# Patient Record
Sex: MALE | Race: WHITE | Employment: STUDENT | URBAN - METROPOLITAN AREA
[De-identification: names, ages, dates, MRNs, and addresses within clinical notes are randomized per-mention and may not be internally consistent; named-entity substitution may affect disease eponyms.]

---

## 2017-05-27 ENCOUNTER — APPOINTMENT (OUTPATIENT)
Dept: CT IMAGING | Age: 27
End: 2017-05-27
Attending: EMERGENCY MEDICINE
Payer: COMMERCIAL

## 2017-05-27 ENCOUNTER — HOSPITAL ENCOUNTER (EMERGENCY)
Age: 27
Discharge: HOME | End: 2017-05-27
Attending: EMERGENCY MEDICINE
Payer: COMMERCIAL

## 2017-05-27 VITALS
RESPIRATION RATE: 18 BRPM | HEART RATE: 80 BPM | WEIGHT: 185 LBS | DIASTOLIC BLOOD PRESSURE: 76 MMHG | TEMPERATURE: 99 F | SYSTOLIC BLOOD PRESSURE: 132 MMHG | OXYGEN SATURATION: 100 % | BODY MASS INDEX: 24.52 KG/M2 | HEIGHT: 73 IN

## 2017-05-27 DIAGNOSIS — K51.90 ULCERATIVE COLITIS WITHOUT COMPLICATIONS, UNSPECIFIED LOCATION (HCC): Primary | ICD-10-CM

## 2017-05-27 PROCEDURE — 99285 EMERGENCY DEPT VISIT HI MDM: CPT

## 2017-05-27 PROCEDURE — 80053 COMPREHEN METABOLIC PANEL: CPT | Performed by: EMERGENCY MEDICINE

## 2017-05-27 PROCEDURE — 74177 CT ABD & PELVIS W/CONTRAST: CPT | Performed by: EMERGENCY MEDICINE

## 2017-05-27 PROCEDURE — 85025 COMPLETE CBC W/AUTO DIFF WBC: CPT | Performed by: EMERGENCY MEDICINE

## 2017-05-27 PROCEDURE — 96361 HYDRATE IV INFUSION ADD-ON: CPT

## 2017-05-27 PROCEDURE — 96375 TX/PRO/DX INJ NEW DRUG ADDON: CPT

## 2017-05-27 PROCEDURE — 96374 THER/PROPH/DIAG INJ IV PUSH: CPT

## 2017-05-27 PROCEDURE — 81003 URINALYSIS AUTO W/O SCOPE: CPT | Performed by: EMERGENCY MEDICINE

## 2017-05-27 RX ORDER — PREDNISONE 20 MG/1
40 TABLET ORAL DAILY
Qty: 28 TABLET | Refills: 0 | Status: SHIPPED | OUTPATIENT
Start: 2017-05-27 | End: 2017-06-10

## 2017-05-27 RX ORDER — MORPHINE SULFATE 4 MG/ML
4 INJECTION, SOLUTION INTRAMUSCULAR; INTRAVENOUS EVERY 30 MIN PRN
Status: DISCONTINUED | OUTPATIENT
Start: 2017-05-27 | End: 2017-05-27

## 2017-05-27 RX ORDER — ONDANSETRON 2 MG/ML
4 INJECTION INTRAMUSCULAR; INTRAVENOUS ONCE
Status: COMPLETED | OUTPATIENT
Start: 2017-05-27 | End: 2017-05-27

## 2017-05-27 RX ORDER — HYDROCODONE BITARTRATE AND ACETAMINOPHEN 5; 325 MG/1; MG/1
1-2 TABLET ORAL EVERY 4 HOURS PRN
Qty: 20 TABLET | Refills: 0 | Status: ON HOLD | OUTPATIENT
Start: 2017-05-27 | End: 2017-06-09

## 2017-05-27 NOTE — ED PROVIDER NOTES
Patient Seen in: [facility] Emergency Department In [place]    History   Patient presents with:  Abdomen/Flank Pain (GI/)  Fever (infectious)    Stated Complaint: has UC, c/o abd pain, fever    HPI    [place] is a 82-year-old with a history of ulcerative c alert and oriented ×3 and appears in no distress  HEENT exam: Tympanic membranes are clear. Canals are normal with no auricular preauricular or mastoid tenderness. Oropharyngeal exam shows no uvula edema or shift.   There is no tongue elevation and palati followed up in the next week with GI services who will write for further steroid taper.   Patient is to return to emergency room for worsening symptoms or other complaints      Disposition and Plan     Clinical Impression:  Ulcerative colitis without compli

## 2017-06-02 ENCOUNTER — APPOINTMENT (OUTPATIENT)
Dept: GENERAL RADIOLOGY | Age: 27
DRG: 386 | End: 2017-06-02
Attending: EMERGENCY MEDICINE
Payer: COMMERCIAL

## 2017-06-02 ENCOUNTER — LAB ENCOUNTER (OUTPATIENT)
Dept: LAB | Facility: HOSPITAL | Age: 27
DRG: 386 | End: 2017-06-02
Attending: EMERGENCY MEDICINE
Payer: COMMERCIAL

## 2017-06-02 ENCOUNTER — HOSPITAL ENCOUNTER (INPATIENT)
Facility: HOSPITAL | Age: 27
LOS: 7 days | Discharge: ANOTHER HOSPITAL | DRG: 386 | End: 2017-06-09
Attending: EMERGENCY MEDICINE | Admitting: INTERNAL MEDICINE
Payer: COMMERCIAL

## 2017-06-02 DIAGNOSIS — R19.7 BLOODY DIARRHEA: Primary | ICD-10-CM

## 2017-06-02 DIAGNOSIS — K51.911 ULCERATIVE COLITIS WITH RECTAL BLEEDING, UNSPECIFIED LOCATION (HCC): ICD-10-CM

## 2017-06-02 DIAGNOSIS — K51.00 ULCERATIVE (CHRONIC) ENTEROCOLITIS (HCC): Primary | ICD-10-CM

## 2017-06-02 PROCEDURE — 87340 HEPATITIS B SURFACE AG IA: CPT

## 2017-06-02 PROCEDURE — 36415 COLL VENOUS BLD VENIPUNCTURE: CPT

## 2017-06-02 PROCEDURE — 86480 TB TEST CELL IMMUN MEASURE: CPT

## 2017-06-02 PROCEDURE — 99223 1ST HOSP IP/OBS HIGH 75: CPT | Performed by: HOSPITALIST

## 2017-06-02 PROCEDURE — 74020 XR ABDOMEN, OBSTRUCTIVE SERIES (CPT=74020): CPT | Performed by: EMERGENCY MEDICINE

## 2017-06-02 RX ORDER — HYDROMORPHONE HYDROCHLORIDE 1 MG/ML
0.5 INJECTION, SOLUTION INTRAMUSCULAR; INTRAVENOUS; SUBCUTANEOUS EVERY 30 MIN PRN
Status: DISCONTINUED | OUTPATIENT
Start: 2017-06-02 | End: 2017-06-08

## 2017-06-02 RX ORDER — HYDROMORPHONE HYDROCHLORIDE 1 MG/ML
1 INJECTION, SOLUTION INTRAMUSCULAR; INTRAVENOUS; SUBCUTANEOUS
Status: DISCONTINUED | OUTPATIENT
Start: 2017-06-02 | End: 2017-06-08

## 2017-06-02 RX ORDER — ONDANSETRON 2 MG/ML
4 INJECTION INTRAMUSCULAR; INTRAVENOUS ONCE
Status: COMPLETED | OUTPATIENT
Start: 2017-06-02 | End: 2017-06-02

## 2017-06-02 RX ORDER — HYDROMORPHONE HYDROCHLORIDE 1 MG/ML
0.5 INJECTION, SOLUTION INTRAMUSCULAR; INTRAVENOUS; SUBCUTANEOUS EVERY 30 MIN PRN
Status: DISCONTINUED | OUTPATIENT
Start: 2017-06-02 | End: 2017-06-02

## 2017-06-02 RX ORDER — DICYCLOMINE HYDROCHLORIDE 10 MG/5ML
20 SOLUTION ORAL
COMMUNITY

## 2017-06-02 RX ORDER — SODIUM CHLORIDE 9 MG/ML
INJECTION, SOLUTION INTRAVENOUS CONTINUOUS
Status: ACTIVE | OUTPATIENT
Start: 2017-06-02 | End: 2017-06-03

## 2017-06-02 RX ORDER — ONDANSETRON 2 MG/ML
4 INJECTION INTRAMUSCULAR; INTRAVENOUS EVERY 6 HOURS PRN
Status: DISCONTINUED | OUTPATIENT
Start: 2017-06-02 | End: 2017-06-09

## 2017-06-02 RX ORDER — ONDANSETRON 2 MG/ML
4 INJECTION INTRAMUSCULAR; INTRAVENOUS EVERY 4 HOURS PRN
Status: DISCONTINUED | OUTPATIENT
Start: 2017-06-02 | End: 2017-06-03

## 2017-06-02 RX ORDER — SODIUM CHLORIDE 9 MG/ML
INJECTION, SOLUTION INTRAVENOUS CONTINUOUS
Status: DISCONTINUED | OUTPATIENT
Start: 2017-06-03 | End: 2017-06-09

## 2017-06-03 ENCOUNTER — APPOINTMENT (OUTPATIENT)
Dept: CT IMAGING | Facility: HOSPITAL | Age: 27
DRG: 386 | End: 2017-06-03
Attending: INTERNAL MEDICINE
Payer: COMMERCIAL

## 2017-06-03 PROCEDURE — 99232 SBSQ HOSP IP/OBS MODERATE 35: CPT | Performed by: HOSPITALIST

## 2017-06-03 PROCEDURE — 74177 CT ABD & PELVIS W/CONTRAST: CPT | Performed by: INTERNAL MEDICINE

## 2017-06-03 RX ORDER — LOPERAMIDE HYDROCHLORIDE 2 MG/1
2 CAPSULE ORAL 4 TIMES DAILY PRN
Status: DISCONTINUED | OUTPATIENT
Start: 2017-06-03 | End: 2017-06-09

## 2017-06-03 NOTE — PROGRESS NOTES
[facility] HOSPITAL[facility]  Progress Note     Christen Rey Patient Status:  Observation    1990 MRN [id number]   [facility] 4NW-A Attending Ru Cartagena MD   Hosp Day # 1 PCP None Pcp     Chief Complaint: Abd Pain  S:  Pain in LLQ, c above  4. Hyponatremia  1. IVF  5. Hypokalemia  1.  Replace     Quality:  · DVT Prophylaxis: ambulate, low risk  · CODE status: full  Estimated date of discharge: TBD  Discharge is dependent on: progress  At this point Mr. Elizabeth Garland is expected to be disch

## 2017-06-03 NOTE — PAYOR COMM NOTE
Attending Physician: Ashutosh Coe MD    Review Type: ADMISSION   Reviewer: Thad Odell       Date: Teri 3, 2017 - 2:27 PM  Payor: [employer] [id number]  Authorization Number: N/[id number]  Admit date: 6/2/2017  8:07 PM   Admitted from Emergency Dept.: yes       ED Provider N (LIALDA OR),  Take by mouth. Budesonide (UCERIS OR),  Take by mouth.   predniSONE 20 MG Oral Tab,  Take 2 tablets (40 mg total) by mouth daily.    HYDROcodone-acetaminophen 5-325 MG Oral Tab,  Take 1-2 tablets by mouth every 4 (four) hours as needed for P URINALYSIS WITH CULTURE REFLEX - Abnormal; Notable for the following:     Clarity Urine Slightly Cloudy (*)     All other components within normal limits   CBC W/ DIFFERENTIAL - Abnormal; Notable for the following:     Neutrophil Absolute Prelim 7.96 (*) Mulugeta Lo MD Service:  Hospitalist Author Type:  Physician    Filed:  6/3/2017 12:57 AM Note Time:  6/2/2017 11:25 PM Status:  Signed    :  Mulugeta Lo MD (Physician)             [facility]  History and Physical   Chief Complaints: guarding or organomegaly. Neurologic: No focal neurological deficits. CNII-XII grossly intact. Musculoskeletal: Moves all extremities. Extremities: No edema or cyanosis. Integument: No rashes or lesions. Psychiatric: Appropriate mood and affect.

## 2017-06-03 NOTE — PLAN OF CARE
GASTROINTESTINAL - ADULT    • Maintains or returns to baseline bowel function Not Progressing          GASTROINTESTINAL - ADULT    • Minimal or absence of nausea and vomiting Progressing        METABOLIC/FLUID AND ELECTROLYTES - ADULT    • Electrolytes kinga

## 2017-06-03 NOTE — H&P
[facility] HOSPITALIST  History and Physical     Sid Norman Patient Status:  Observation    1990 MRN [id number]   [facility] 4NW-A Attending Jacklyn Sky MD   Hosp Day # 0 PCP None Pcp     Chief Complaints:  Abdominal pain    Hist positives and negatives noted in the HPI. Physical Exam:    /69 mmHg  Pulse 73  Temp(Src) 98.8 °F (37.1 °C) (Oral)  Resp 18  Ht 6' 2\" (1.88 m)  Wt 181 lb 3.5 oz (82.2 kg)  BMI 23.26 kg/m2  SpO2 99%  General: No acute distress.  Alert and oriented

## 2017-06-03 NOTE — ED PROVIDER NOTES
Patient Seen in: [facility] Emergency Department In [place]    History   Patient presents with:  Abdomen/Flank Pain (GI/)    Stated Complaint: abd pain    HPI    24-year-old male diagnosed with ulcerative colitis about 6 months ago presents the emergenc elements reviewed from today and agreed except as otherwise stated in HPI.     Physical Exam       ED Triage Vitals   BP 06/02/17 2030 124/68 mmHg   Pulse 06/02/17 2030 80   Resp 06/02/17 2030 16   Temp 06/02/17 2030 99.3 °F (37.4 °C)   Temp src 06/02/17 20 Status                     ---------                               -----------         ------                     CBC W/ DIFFERENTIAL[[id number]]           Abnormal            Final result                 Please view results for these tests on the individual

## 2017-06-03 NOTE — CONSULTS
[facility]                       Gastroenterology [address] [facility]    Reatha Leisure Patient Status:  Observation    1990 MRN [id number]   [facility] 4NW-A Attending Darin Wen MD   [facility]) injection 4 mg 4 mg Intravenous Q4H PRN   HYDROmorphone HCl PF (DILAUDID) 1 MG/ML injection 1 mg 1 mg Intravenous Q3H PRN   ondansetron HCl (ZOFRAN) injection 4 mg 4 mg Intravenous Q6H PRN   0.9%  NaCl infusion  Intravenous Continuous     Allergie Wt 181 lb 3.5 oz (82.2 kg)  BMI 23.26 kg/m2  SpO2 100%  Gen: AAO x 3, able to speak in complete sentences  HENT: NCAT, EOMI, PERRL, oropharynx is clear with moist mucosal membranes  Eyes: Sclerae are anicteric  Neck:  Supple without nuchal rigidity;  No lym diagnosed with ulcerative colitis January 2017. For past 6 weeks chronic abdomen pain.  Kika Tia for past few days with fever.         FINDINGS:    BOWEL GAS PATTERN:  Multiple air-filled loops of small and large bowel are identified, small bowel loops brittni

## 2017-06-04 PROCEDURE — 99232 SBSQ HOSP IP/OBS MODERATE 35: CPT | Performed by: HOSPITALIST

## 2017-06-04 NOTE — PROGRESS NOTES
Gastroenterology Progress Note  S: Feeling better today. Still with frequent stools, but no blood, pain better.    O: /67 mmHg  Pulse 55  Temp(Src) 97.6 °F (36.4 °C) (Oral)  Resp 18  Ht 188 cm (6' 2\")  Wt 181 lb 3.5 oz (82.2 kg)  BMI 23.26 kg/m2  SpO    KIDNEYS:  Normal.  No mass, obstruction, or calcification.     ADRENALS:  Normal.  No mass or enlargement.     AORTA/VASCULAR:  Normal.  No aneurysm or dissection.     RETROPERITONEUM:  Normal.  No mass or adenopathy.     BOWEL/MESENTERY:  Current exami mesalamine. C.diff negative. CT shows moderate left sided colitis, better with IV steroids  Plan:   1. Continue IV Solucortef until Monday, would transition to Prednisone on Monday if he continues to improve  2.  Would monitor as IP for one more day on or

## 2017-06-04 NOTE — PROGRESS NOTES
[facility]PITAL[facility]  Progress Note     Christen Rey Patient Status:  Observation    1990 MRN [id number]   [facility] 4NW-A Attending Ru Cartagena MD   Hosp Day # 2 PCP None Pcp     Chief Complaint: Abd Pain  S:  Pain in LLQ im the next 24 hours or so  3. ?advance diet- defer to GI  4. Insurance to look at Humira  2. Ileus vs Parital SBO- improved clinically. +BS  3. Ulcerative colitis  1. As above  4. Hyponatremia  1. IVF  5. Hypokalemia  1.  Replace     Quality:  · DVT Prophylax

## 2017-06-04 NOTE — PLAN OF CARE
Pt's states per Dr Yonathan Medina, can advance diet. Pt's mom at bedside asking to talk with Dr Yonathan Medina. Pt ok with MD talking to mom. Dr Pedraza Hampton in and spoke with pt's mom and pt. Dr Yonathan Medina paged. Low residue diet ordered.  Dr Yonathan Medina able to t

## 2017-06-04 NOTE — PROGRESS NOTES
No events overnite. continiues to have frequent diarrhea stool. Taking dilaudid every 3 hours for the pain. Reports urinating with each bowel movement.

## 2017-06-05 PROCEDURE — 99232 SBSQ HOSP IP/OBS MODERATE 35: CPT | Performed by: HOSPITALIST

## 2017-06-05 NOTE — PROGRESS NOTES
[facility]PITAL[facility]  Progress Note     Jesica Mcbride Patient Status:  Observation    1990 MRN [id number]   [facility] 4NW-A Attending Stacey Jimenez MD   Hosp Day # 3 PCP None Pcp     Chief Complaint: Abd Pain  S:  Pain in LLQ im GI  4. Insurance to look at Humira  2. Ileus vs Parital SBO- improved clinically. 3. Ulcerative colitis  1. As above  4. Hyponatremia  1. IVF  5. Hypokalemia  1.  Replace     Quality:  · DVT Prophylaxis: ambulate, low risk  · CODE status: full  Estimated d

## 2017-06-05 NOTE — PROGRESS NOTES
[facility]    Gastroenterology Follow-Up Note      Mini Galan Patient Status:  Inpatient    1990 MRN [id number]   [facility] 4NW-A Attending James Gomez MD   Hosp Day # 3 PCP None Pcp     Chief Complaint/Reason for Fo

## 2017-06-05 NOTE — DIETARY NOTE
Nutrition Short Note    Educated patient on low FODMAPdiet/diet for ulcerative colitis , discussed in detail. Explained foods to avoid, and foods to include. Handout and resources provided. Pt receptive, expect compliance. RD contact information provided.

## 2017-06-06 PROCEDURE — 99232 SBSQ HOSP IP/OBS MODERATE 35: CPT | Performed by: HOSPITALIST

## 2017-06-06 NOTE — PROGRESS NOTES
[facility]PITAL[facility]  Progress Note     Slime Alaniz Patient Status:  Observation    1990 MRN [id number]   [facility] 4NW-A Attending Aurelio Stewart MD   Hosp Day # 4 PCP None Pcp     Chief Complaint: Abd Pain  S:  Pain in LLQ- a on: progress  At this point Mr. Consuelo Leon is expected to be discharge to: home     Ade Waite MD

## 2017-06-06 NOTE — PROGRESS NOTES
[facility]    Gastroenterology Follow-Up Note      Kelli Lozano Patient Status:  Inpatient    1990 MRN [id number]   [facility] 4NW-A Attending Nisha Anaya MD   Hosp Day # 4 PCP None Pcp     Chief Complaint/Reason for Fo

## 2017-06-06 NOTE — PAYOR COMM NOTE
--------------  CONTINUED STAY REVIEW        6/3         Chief Complaint: Abd Pain  S:  Pain in LLQ, crampy. Persistent bloody diarrhea and febrile. No vomiting.  Poor oral intake.  No jaundice or rashes.  No CP or SOB        Temp:  [98.8 °F (37.1 °C)-99.7

## 2017-06-07 PROCEDURE — 99232 SBSQ HOSP IP/OBS MODERATE 35: CPT | Performed by: INTERNAL MEDICINE

## 2017-06-07 RX ORDER — HYDROCODONE BITARTRATE AND ACETAMINOPHEN 5; 325 MG/1; MG/1
1 TABLET ORAL EVERY 4 HOURS PRN
Status: DISCONTINUED | OUTPATIENT
Start: 2017-06-07 | End: 2017-06-09

## 2017-06-07 RX ORDER — HYDROMORPHONE HYDROCHLORIDE 1 MG/ML
0.5 INJECTION, SOLUTION INTRAMUSCULAR; INTRAVENOUS; SUBCUTANEOUS ONCE
Status: COMPLETED | OUTPATIENT
Start: 2017-06-07 | End: 2017-06-07

## 2017-06-07 NOTE — PROGRESS NOTES
[facility]  Progress Note     Juanpablo Purvis Patient Status:  Observation    1990 MRN [id number]   [facility] 4NW-A Attending Al Aguilar MD   Hosp Day # 5 PCP None Pcp     Chief Complaint: Abd Pain     S:  Pain doesn' discharge to: home     Luis Ochoa MD

## 2017-06-07 NOTE — PROGRESS NOTES
[facility]    Gastroenterology Follow-Up Note      Ailyn Cameron Patient Status:  Inpatient    1990 MRN [id number]   [facility] 4NW-A Attending Magdi Goode MD   Hosp Day # 5 PCP None Pcp     Chief Complaint/Reason for Fo

## 2017-06-07 NOTE — PLAN OF CARE
Pt alert and oriented x4. VSS and afebrile. Pt continues to complain of pain on a scale of 6/10. PRN dilaudid given per [facility] every 3 hours for pain.   Pt states that pain levels only drop to 4/10 and that the dilaudid only takes the edge away but does not

## 2017-06-08 PROCEDURE — 99232 SBSQ HOSP IP/OBS MODERATE 35: CPT | Performed by: INTERNAL MEDICINE

## 2017-06-08 RX ORDER — PREDNISONE 20 MG/1
40 TABLET ORAL ONCE
Status: DISCONTINUED | OUTPATIENT
Start: 2017-06-08 | End: 2017-06-08

## 2017-06-08 RX ORDER — HYDROMORPHONE HYDROCHLORIDE 1 MG/ML
0.5 INJECTION, SOLUTION INTRAMUSCULAR; INTRAVENOUS; SUBCUTANEOUS ONCE
Status: COMPLETED | OUTPATIENT
Start: 2017-06-08 | End: 2017-06-08

## 2017-06-08 RX ORDER — DICYCLOMINE HYDROCHLORIDE 10 MG/1
10 CAPSULE ORAL 3 TIMES DAILY PRN
Status: DISCONTINUED | OUTPATIENT
Start: 2017-06-08 | End: 2017-06-09

## 2017-06-08 RX ORDER — HYDROCODONE BITARTRATE AND ACETAMINOPHEN 5; 325 MG/1; MG/1
1 TABLET ORAL ONCE
Status: DISCONTINUED | OUTPATIENT
Start: 2017-06-08 | End: 2017-06-09

## 2017-06-08 RX ORDER — HYDROCODONE BITARTRATE AND ACETAMINOPHEN 5; 325 MG/1; MG/1
2 TABLET ORAL EVERY 6 HOURS PRN
Status: DISCONTINUED | OUTPATIENT
Start: 2017-06-08 | End: 2017-06-09

## 2017-06-08 RX ORDER — PREDNISONE 20 MG/1
40 TABLET ORAL
Status: DISCONTINUED | OUTPATIENT
Start: 2017-06-08 | End: 2017-06-09

## 2017-06-08 NOTE — PROGRESS NOTES
[facility]ITAL[facility]  Progress Note     Mary Dumont Patient Status:  Observation    1990 MRN [id number]   [facility] 4NW-A Attending Chaka Salazar MD   Hosp Day # 6 PCP None Pcp     Chief Complaint: Abd Pain     S:  Patient fee tomorrow  Discharge is dependent on: pain not worse on PO steroids  At this point Mr. Faye Carpenter is expected to be discharge to: home     Rain Thompson MD

## 2017-06-08 NOTE — PROGRESS NOTES
[facility]    Gastroenterology Follow-Up Note      Jaime Xavier Patient Status:  Inpatient    1990 MRN [id number]   [facility] 4NW-A Attending Owen Ferreira MD   Hosp Day # 6 PCP None Pcp     Chief Complaint/Reason for Fo minutes with the patient >50% of visit was spent in counseling and coordination of care.       Justin Ryan  Gastroenterology/[facility] [address] [facility]

## 2017-06-08 NOTE — PLAN OF CARE
Pt up walking the halls overnight. Pt states that joint swelling has improved. Per pt, symptoms have improved since remicade however pt does not feel ready for discharge at this time. Pt has 1 loose Bm overnight.   Continues on IV pain medication every 3

## 2017-06-08 NOTE — CM/SW NOTE
06/08/17 1600   CM/SW Screening   Referral [facility] staff; Chart review;Nursing rounds   Patient's Mental Status Alert;Oriented   Patient Status Prior to Admission   Independent with ADLs and Mobility Yes   Discharge N

## 2017-06-09 VITALS
WEIGHT: 181.19 LBS | TEMPERATURE: 98 F | DIASTOLIC BLOOD PRESSURE: 65 MMHG | RESPIRATION RATE: 18 BRPM | HEIGHT: 74 IN | BODY MASS INDEX: 23.25 KG/M2 | OXYGEN SATURATION: 99 % | SYSTOLIC BLOOD PRESSURE: 115 MMHG | HEART RATE: 52 BPM

## 2017-06-09 PROCEDURE — 99239 HOSP IP/OBS DSCHRG MGMT >30: CPT | Performed by: INTERNAL MEDICINE

## 2017-06-09 RX ORDER — HYDROMORPHONE HYDROCHLORIDE 1 MG/ML
INJECTION, SOLUTION INTRAMUSCULAR; INTRAVENOUS; SUBCUTANEOUS
Status: DISCONTINUED
Start: 2017-06-09 | End: 2017-06-09

## 2017-06-09 RX ORDER — HYDROMORPHONE HYDROCHLORIDE 1 MG/ML
0.5 INJECTION, SOLUTION INTRAMUSCULAR; INTRAVENOUS; SUBCUTANEOUS
Status: DISCONTINUED | OUTPATIENT
Start: 2017-06-09 | End: 2017-06-09

## 2017-06-09 NOTE — PAYOR COMM NOTE
--------------  CONTINUED STAY REVIEW    Payor: [employer] PPO    6/8         Chief Complaint: Abd Pain      S:  Patient feels abdominal pain is slightly better today. Had possible small amount of blood in loose stool today.     Review of Systems:   6 point JAIR c

## 2017-06-09 NOTE — PLAN OF CARE
Patient alert and oriented x4. Patient complaining of severe pain this morning. Patient states that norco does not work for him. Dr. Edward Dunn paged to see if we can get something IV for pain control. Patient seen by Dr. Mable Vidal this morning.  UA and c-diff s

## 2017-06-09 NOTE — PROGRESS NOTES
Patient accepted by [facility]. Will restart IV steroids. No addition Remicade at this time.      [address] [facility]  Gastroenterology  [facility] Gastroenterology

## 2017-06-09 NOTE — PROGRESS NOTES
NURSING DISCHARGE NOTE    Discharged Another hospital via Ambulance. Accompanied by Family member  Belongings Taken by patient/family.       Patient for transfer to [facility] today at [address] Discharge instructions and report called to [facility]

## 2017-06-09 NOTE — CM/SW NOTE
Dr. Brenton Muniz requested that this writer contact [place] center at [place] [address] with [address] [place] [address] at [phone] and faxed face sheet to [address] [place] [address]. Alayna ROBLES  Requested another face sheet to be faxed to [phone] which [place], there are beds available and he will call pt's RN Yaneth Montelongo with room # and address of building patient will be going to at [address] of [address] [facility] with unit number and charge nurse Deja's phone # for additional contact numbers.

## 2017-06-09 NOTE — PLAN OF CARE
GASTROINTESTINAL - ADULT    • Maintains or returns to baseline bowel function Not Progressing        PAIN - ADULT    • Verbalizes/displays adequate comfort level or patient's stated pain goal Not Progressing          GASTROINTESTINAL - ADULT    • Minimal o

## 2017-06-09 NOTE — PROGRESS NOTES
[facility]    Gastroenterology Follow-Up Note      Loyalhanna Mikaela Patient Status:  Inpatient    1990 MRN [id number]   [facility] 4NW-A Attending Ritika Conn MD   Hosp Day # 7 PCP None Pcp     Chief Complaint/Reason for Fo sided UC refractory to IV steroids and initial dose of Remicade, will initiate inpatient transfer to [facility] with GI consult for further evaluation/management. Patient and mother are agreeable to this plan.      [address] [facility]  Gastroenterology  [facility]

## 2017-06-09 NOTE — DISCHARGE SUMMARY
[facility] HOSPITALIST  DISCHARGE SUMMARY     Ailyn Cameron Patient Status:  Inpatient    1990 MRN [id number]   [facility] 4NW-A Attending Gwendolyn Srinivasan MD   [facility] Day # 7 PCP None Pcp     Date of Admission: 2017  Date of Disch and stool cultures were negative. Patient did not have significant improvement in his abdominal pain and diarrhea on IV steroids so after several days of no response, GI recommended a remicade infusion.  Patient did note modest improvement in his abdominal by mouth 4 (four) times daily before meals and nightly. Refills:  0       HYDROcodone-acetaminophen 5-325 MG Tabs   Last time this was given:  2 tablets on 6/9/2017  4:00 AM   Commonly known as:  Emani Batres   Ask about: Should I take this medication?

## 2017-06-10 NOTE — PROGRESS NOTES
NURSING DISCHARGE NOTE    Discharged Another hospital via Ambulance. Accompanied by Support staff  Belongings Taken by patient/family. No distress noted.

## 2017-06-10 NOTE — PROGRESS NOTES
Pt to be transferred to [facility], awaiting ambulance  who said they were running 30-40 minutes late, pt medicated for pain as per MAR, no signs of distress, continue to monitor.

## 2017-06-14 NOTE — PAYOR COMM NOTE
--------------  CONTINUED STAY REVIEW    Payor: [facility] PPO  Authorization Number: [id number]    Admit date: 6/2/2017  8:07 PM       Admitting Physician: Rubén Lyons MD  Attending Physician:  No att. providers found  Primary Care Physician: Pcp, None    RE

## 2017-07-24 ENCOUNTER — LAB REQUISITION (OUTPATIENT)
Dept: LAB | Age: 27
End: 2017-07-24
Payer: COMMERCIAL

## 2017-07-24 DIAGNOSIS — K51.918 ULCERATIVE COLITIS, UNSPECIFIED WITH OTHER COMPLICATION (HCC): ICD-10-CM

## 2017-07-24 LAB
ALBUMIN SERPL-MCNC: 3.2 G/DL (ref 3.5–4.8)
ALP LIVER SERPL-CCNC: 131 U/L (ref 45–117)
ALT SERPL-CCNC: 72 U/L (ref 17–63)
AST SERPL-CCNC: 30 U/L (ref 15–41)
BILIRUB SERPL-MCNC: 0.3 MG/DL (ref 0.1–2)
BUN BLD-MCNC: 20 MG/DL (ref 8–20)
CALCIUM BLD-MCNC: 8.8 MG/DL (ref 8.3–10.3)
CHLORIDE: 105 MMOL/L (ref 101–111)
CO2: 27 MMOL/L (ref 22–32)
CREAT BLD-MCNC: 0.6 MG/DL (ref 0.7–1.3)
GLUCOSE BLD-MCNC: 120 MG/DL (ref 70–99)
HAV IGM SER QL: 1.9 MG/DL (ref 1.7–3)
M PROTEIN MFR SERPL ELPH: 8.4 G/DL (ref 6.1–8.3)
PHOSPHATE SERPL-MCNC: 4.5 MG/DL (ref 2.5–4.9)
POTASSIUM SERPL-SCNC: 4 MMOL/L (ref 3.6–5.1)
SODIUM SERPL-SCNC: 138 MMOL/L (ref 136–144)

## 2017-07-24 PROCEDURE — 83735 ASSAY OF MAGNESIUM: CPT | Performed by: COLON & RECTAL SURGERY

## 2017-07-24 PROCEDURE — 84100 ASSAY OF PHOSPHORUS: CPT | Performed by: COLON & RECTAL SURGERY

## 2017-07-24 PROCEDURE — 80053 COMPREHEN METABOLIC PANEL: CPT | Performed by: COLON & RECTAL SURGERY

## 2022-12-04 ENCOUNTER — APPOINTMENT (OUTPATIENT)
Dept: CT IMAGING | Facility: HOSPITAL | Age: 32
End: 2022-12-04
Attending: EMERGENCY MEDICINE
Payer: COMMERCIAL

## 2022-12-04 ENCOUNTER — HOSPITAL ENCOUNTER (INPATIENT)
Facility: HOSPITAL | Age: 32
LOS: 3 days | Discharge: HOME | End: 2022-12-07
Attending: EMERGENCY MEDICINE | Admitting: INTERNAL MEDICINE
Payer: COMMERCIAL

## 2022-12-04 ENCOUNTER — ANESTHESIA EVENT (OUTPATIENT)
Dept: SURGERY | Facility: HOSPITAL | Age: 32
End: 2022-12-04
Payer: COMMERCIAL

## 2022-12-04 ENCOUNTER — ANESTHESIA (OUTPATIENT)
Dept: SURGERY | Facility: HOSPITAL | Age: 32
End: 2022-12-04
Payer: COMMERCIAL

## 2022-12-04 DIAGNOSIS — L02.31 LEFT BUTTOCK ABSCESS: Primary | ICD-10-CM

## 2022-12-04 DIAGNOSIS — K61.0 PERIANAL ABSCESS: ICD-10-CM

## 2022-12-04 DIAGNOSIS — N41.2 PROSTATE ABSCESS: ICD-10-CM

## 2022-12-04 LAB
ALBUMIN SERPL-MCNC: 3.2 G/DL (ref 3.4–5)
ALBUMIN/GLOB SERPL: 0.6 {RATIO} (ref 1–2)
ALP LIVER SERPL-CCNC: 54 U/L
ALT SERPL-CCNC: 50 U/L
ANION GAP SERPL CALC-SCNC: 6 MMOL/L (ref 0–18)
AST SERPL-CCNC: 18 U/L (ref 15–37)
BASOPHILS # BLD AUTO: 0.06 X10(3) UL (ref 0–0.2)
BASOPHILS NFR BLD AUTO: 0.2 %
BILIRUB SERPL-MCNC: 0.6 MG/DL (ref 0.1–2)
BILIRUB UR QL STRIP.AUTO: NEGATIVE
BUN BLD-MCNC: 12 MG/DL (ref 7–18)
CALCIUM BLD-MCNC: 9.4 MG/DL (ref 8.5–10.1)
CHLORIDE SERPL-SCNC: 102 MMOL/L (ref 98–112)
CLARITY UR REFRACT.AUTO: CLEAR
CO2 SERPL-SCNC: 26 MMOL/L (ref 21–32)
COLOR UR AUTO: YELLOW
CREAT BLD-MCNC: 1.24 MG/DL
EOSINOPHIL # BLD AUTO: 0.03 X10(3) UL (ref 0–0.7)
EOSINOPHIL NFR BLD AUTO: 0.1 %
ERYTHROCYTE [DISTWIDTH] IN BLOOD BY AUTOMATED COUNT: 14.4 %
GFR SERPLBLD BASED ON 1.73 SQ M-ARVRAT: 80 ML/MIN/1.73M2 (ref 60–?)
GLOBULIN PLAS-MCNC: 5.8 G/DL (ref 2.8–4.4)
GLUCOSE BLD-MCNC: 100 MG/DL (ref 70–99)
GLUCOSE UR STRIP.AUTO-MCNC: NEGATIVE MG/DL
HCT VFR BLD AUTO: 39.1 %
HGB BLD-MCNC: 13.3 G/DL
IMM GRANULOCYTES # BLD AUTO: 0.11 X10(3) UL (ref 0–1)
IMM GRANULOCYTES NFR BLD: 0.5 %
KETONES UR STRIP.AUTO-MCNC: 20 MG/DL
LACTATE SERPL-SCNC: 1 MMOL/L (ref 0.4–2)
LEUKOCYTE ESTERASE UR QL STRIP.AUTO: NEGATIVE
LYMPHOCYTES # BLD AUTO: 3.68 X10(3) UL (ref 1–4)
LYMPHOCYTES NFR BLD AUTO: 15.2 %
MCH RBC QN AUTO: 30.8 PG (ref 26–34)
MCHC RBC AUTO-ENTMCNC: 34 G/DL (ref 31–37)
MCV RBC AUTO: 90.5 FL
MONOCYTES # BLD AUTO: 2.13 X10(3) UL (ref 0.1–1)
MONOCYTES NFR BLD AUTO: 8.8 %
NEUTROPHILS # BLD AUTO: 18.15 X10 (3) UL (ref 1.5–7.7)
NEUTROPHILS # BLD AUTO: 18.15 X10(3) UL (ref 1.5–7.7)
NEUTROPHILS NFR BLD AUTO: 75.2 %
NITRITE UR QL STRIP.AUTO: NEGATIVE
OSMOLALITY SERPL CALC.SUM OF ELEC: 278 MOSM/KG (ref 275–295)
PH UR STRIP.AUTO: 6 [PH] (ref 5–8)
PLATELET # BLD AUTO: 404 10(3)UL (ref 150–450)
POTASSIUM SERPL-SCNC: 3.7 MMOL/L (ref 3.5–5.1)
PROT SERPL-MCNC: 9 G/DL (ref 6.4–8.2)
PROT UR STRIP.AUTO-MCNC: NEGATIVE MG/DL
RBC # BLD AUTO: 4.32 X10(6)UL
RBC UR QL AUTO: NEGATIVE
SARS-COV-2 RNA RESP QL NAA+PROBE: NOT DETECTED
SODIUM SERPL-SCNC: 134 MMOL/L (ref 136–145)
SP GR UR STRIP.AUTO: >1.03 (ref 1–1.03)
UROBILINOGEN UR STRIP.AUTO-MCNC: <2 MG/DL
WBC # BLD AUTO: 24.2 X10(3) UL (ref 4–11)

## 2022-12-04 PROCEDURE — 0D9Q0ZZ DRAINAGE OF ANUS, OPEN APPROACH: ICD-10-PCS | Performed by: STUDENT IN AN ORGANIZED HEALTH CARE EDUCATION/TRAINING PROGRAM

## 2022-12-04 PROCEDURE — 46050 I&D PERIANAL ABSCESS SUPFC: CPT | Performed by: STUDENT IN AN ORGANIZED HEALTH CARE EDUCATION/TRAINING PROGRAM

## 2022-12-04 PROCEDURE — 99223 1ST HOSP IP/OBS HIGH 75: CPT | Performed by: INTERNAL MEDICINE

## 2022-12-04 PROCEDURE — 74177 CT ABD & PELVIS W/CONTRAST: CPT | Performed by: EMERGENCY MEDICINE

## 2022-12-04 PROCEDURE — 99254 IP/OBS CNSLTJ NEW/EST MOD 60: CPT | Performed by: STUDENT IN AN ORGANIZED HEALTH CARE EDUCATION/TRAINING PROGRAM

## 2022-12-04 RX ORDER — SODIUM CHLORIDE, SODIUM LACTATE, POTASSIUM CHLORIDE, CALCIUM CHLORIDE 600; 310; 30; 20 MG/100ML; MG/100ML; MG/100ML; MG/100ML
INJECTION, SOLUTION INTRAVENOUS CONTINUOUS PRN
Status: DISCONTINUED | OUTPATIENT
Start: 2022-12-04 | End: 2022-12-04 | Stop reason: SURG

## 2022-12-04 RX ORDER — HYDROMORPHONE HYDROCHLORIDE 1 MG/ML
0.6 INJECTION, SOLUTION INTRAMUSCULAR; INTRAVENOUS; SUBCUTANEOUS EVERY 5 MIN PRN
Status: DISCONTINUED | OUTPATIENT
Start: 2022-12-04 | End: 2022-12-04 | Stop reason: HOSPADM

## 2022-12-04 RX ORDER — CIPROFLOXACIN HCL 100 MG
100 TABLET ORAL 2 TIMES DAILY
COMMUNITY
End: 2022-12-07

## 2022-12-04 RX ORDER — CLONAZEPAM 0.5 MG/1
0.5 TABLET ORAL 2 TIMES DAILY PRN
Status: DISCONTINUED | OUTPATIENT
Start: 2022-12-04 | End: 2022-12-07

## 2022-12-04 RX ORDER — SODIUM CHLORIDE 9 MG/ML
INJECTION, SOLUTION INTRAVENOUS CONTINUOUS
Status: ACTIVE | OUTPATIENT
Start: 2022-12-04 | End: 2022-12-04

## 2022-12-04 RX ORDER — CLONAZEPAM 0.5 MG/1
0.5 TABLET, ORALLY DISINTEGRATING ORAL 2 TIMES DAILY PRN
COMMUNITY

## 2022-12-04 RX ORDER — FLUOXETINE 10 MG/1
10 TABLET, FILM COATED ORAL DAILY
Status: DISCONTINUED | OUTPATIENT
Start: 2022-12-04 | End: 2022-12-07

## 2022-12-04 RX ORDER — NALOXONE HYDROCHLORIDE 0.4 MG/ML
80 INJECTION, SOLUTION INTRAMUSCULAR; INTRAVENOUS; SUBCUTANEOUS AS NEEDED
Status: DISCONTINUED | OUTPATIENT
Start: 2022-12-04 | End: 2022-12-04 | Stop reason: HOSPADM

## 2022-12-04 RX ORDER — HYDROMORPHONE HYDROCHLORIDE 1 MG/ML
0.4 INJECTION, SOLUTION INTRAMUSCULAR; INTRAVENOUS; SUBCUTANEOUS EVERY 5 MIN PRN
Status: DISCONTINUED | OUTPATIENT
Start: 2022-12-04 | End: 2022-12-04 | Stop reason: HOSPADM

## 2022-12-04 RX ORDER — MELATONIN
3 NIGHTLY PRN
Status: DISCONTINUED | OUTPATIENT
Start: 2022-12-04 | End: 2022-12-07

## 2022-12-04 RX ORDER — HYDROMORPHONE HYDROCHLORIDE 1 MG/ML
0.5 INJECTION, SOLUTION INTRAMUSCULAR; INTRAVENOUS; SUBCUTANEOUS EVERY 30 MIN PRN
Status: DISCONTINUED | OUTPATIENT
Start: 2022-12-04 | End: 2022-12-04 | Stop reason: HOSPADM

## 2022-12-04 RX ORDER — SODIUM CHLORIDE, SODIUM LACTATE, POTASSIUM CHLORIDE, CALCIUM CHLORIDE 600; 310; 30; 20 MG/100ML; MG/100ML; MG/100ML; MG/100ML
INJECTION, SOLUTION INTRAVENOUS CONTINUOUS
Status: DISCONTINUED | OUTPATIENT
Start: 2022-12-04 | End: 2022-12-04 | Stop reason: HOSPADM

## 2022-12-04 RX ORDER — MIDAZOLAM HYDROCHLORIDE 1 MG/ML
INJECTION INTRAMUSCULAR; INTRAVENOUS
Status: COMPLETED
Start: 2022-12-04 | End: 2022-12-04

## 2022-12-04 RX ORDER — PROCHLORPERAZINE EDISYLATE 5 MG/ML
5 INJECTION INTRAMUSCULAR; INTRAVENOUS EVERY 8 HOURS PRN
Status: DISCONTINUED | OUTPATIENT
Start: 2022-12-04 | End: 2022-12-07

## 2022-12-04 RX ORDER — IOHEXOL 350 MG/ML
100 INJECTION, SOLUTION INTRAVENOUS
Status: COMPLETED | OUTPATIENT
Start: 2022-12-04 | End: 2022-12-04

## 2022-12-04 RX ORDER — ACETAMINOPHEN 500 MG
1000 TABLET ORAL EVERY 8 HOURS PRN
Status: DISCONTINUED | OUTPATIENT
Start: 2022-12-04 | End: 2022-12-07

## 2022-12-04 RX ORDER — HYDROCODONE BITARTRATE AND ACETAMINOPHEN 5; 325 MG/1; MG/1
2 TABLET ORAL EVERY 4 HOURS PRN
Status: DISCONTINUED | OUTPATIENT
Start: 2022-12-04 | End: 2022-12-07

## 2022-12-04 RX ORDER — MIDAZOLAM HYDROCHLORIDE 1 MG/ML
INJECTION INTRAMUSCULAR; INTRAVENOUS AS NEEDED
Status: DISCONTINUED | OUTPATIENT
Start: 2022-12-04 | End: 2022-12-04 | Stop reason: SURG

## 2022-12-04 RX ORDER — ACETAMINOPHEN 325 MG/1
650 TABLET ORAL EVERY 4 HOURS PRN
Status: DISCONTINUED | OUTPATIENT
Start: 2022-12-04 | End: 2022-12-07

## 2022-12-04 RX ORDER — MIDAZOLAM HYDROCHLORIDE 1 MG/ML
1 INJECTION INTRAMUSCULAR; INTRAVENOUS EVERY 5 MIN PRN
Status: COMPLETED | OUTPATIENT
Start: 2022-12-04 | End: 2022-12-04

## 2022-12-04 RX ORDER — HYDROMORPHONE HYDROCHLORIDE 1 MG/ML
0.2 INJECTION, SOLUTION INTRAMUSCULAR; INTRAVENOUS; SUBCUTANEOUS EVERY 2 HOUR PRN
Status: DISCONTINUED | OUTPATIENT
Start: 2022-12-04 | End: 2022-12-07

## 2022-12-04 RX ORDER — ONDANSETRON 2 MG/ML
4 INJECTION INTRAMUSCULAR; INTRAVENOUS EVERY 4 HOURS PRN
Status: DISCONTINUED | OUTPATIENT
Start: 2022-12-04 | End: 2022-12-04

## 2022-12-04 RX ORDER — FLUOXETINE 10 MG/1
10 CAPSULE ORAL DAILY
COMMUNITY

## 2022-12-04 RX ORDER — HYDROMORPHONE HYDROCHLORIDE 1 MG/ML
0.5 INJECTION, SOLUTION INTRAMUSCULAR; INTRAVENOUS; SUBCUTANEOUS EVERY 30 MIN PRN
Status: COMPLETED | OUTPATIENT
Start: 2022-12-04 | End: 2022-12-04

## 2022-12-04 RX ORDER — HYDROCODONE BITARTRATE AND ACETAMINOPHEN 5; 325 MG/1; MG/1
1 TABLET ORAL EVERY 4 HOURS PRN
Status: DISCONTINUED | OUTPATIENT
Start: 2022-12-04 | End: 2022-12-07

## 2022-12-04 RX ORDER — HYDROMORPHONE HYDROCHLORIDE 1 MG/ML
0.2 INJECTION, SOLUTION INTRAMUSCULAR; INTRAVENOUS; SUBCUTANEOUS EVERY 5 MIN PRN
Status: DISCONTINUED | OUTPATIENT
Start: 2022-12-04 | End: 2022-12-04 | Stop reason: HOSPADM

## 2022-12-04 RX ORDER — OXYCODONE HYDROCHLORIDE 5 MG/1
5 TABLET ORAL EVERY 4 HOURS PRN
Status: DISCONTINUED | OUTPATIENT
Start: 2022-12-04 | End: 2022-12-07

## 2022-12-04 RX ORDER — BUPIVACAINE HYDROCHLORIDE 7.5 MG/ML
INJECTION, SOLUTION INTRASPINAL AS NEEDED
Status: DISCONTINUED | OUTPATIENT
Start: 2022-12-04 | End: 2022-12-04 | Stop reason: SURG

## 2022-12-04 RX ORDER — ACETAMINOPHEN 500 MG
1000 TABLET ORAL EVERY 8 HOURS
Status: DISCONTINUED | OUTPATIENT
Start: 2022-12-04 | End: 2022-12-07

## 2022-12-04 RX ORDER — ONDANSETRON 2 MG/ML
4 INJECTION INTRAMUSCULAR; INTRAVENOUS EVERY 6 HOURS PRN
Status: DISCONTINUED | OUTPATIENT
Start: 2022-12-04 | End: 2022-12-07

## 2022-12-04 RX ORDER — KETAMINE HYDROCHLORIDE 50 MG/ML
INJECTION, SOLUTION, CONCENTRATE INTRAMUSCULAR; INTRAVENOUS AS NEEDED
Status: DISCONTINUED | OUTPATIENT
Start: 2022-12-04 | End: 2022-12-04 | Stop reason: SURG

## 2022-12-04 RX ORDER — SODIUM CHLORIDE 9 MG/ML
INJECTION, SOLUTION INTRAVENOUS CONTINUOUS
Status: DISCONTINUED | OUTPATIENT
Start: 2022-12-04 | End: 2022-12-05

## 2022-12-04 RX ORDER — HYDROMORPHONE HYDROCHLORIDE 1 MG/ML
0.5 INJECTION, SOLUTION INTRAMUSCULAR; INTRAVENOUS; SUBCUTANEOUS EVERY 30 MIN PRN
Status: DISCONTINUED | OUTPATIENT
Start: 2022-12-04 | End: 2022-12-04 | Stop reason: SDUPTHER

## 2022-12-04 RX ORDER — BUPIVACAINE HYDROCHLORIDE 2.5 MG/ML
INJECTION, SOLUTION EPIDURAL; INFILTRATION; INTRACAUDAL AS NEEDED
Status: DISCONTINUED | OUTPATIENT
Start: 2022-12-04 | End: 2022-12-04 | Stop reason: HOSPADM

## 2022-12-04 RX ADMIN — SODIUM CHLORIDE, SODIUM LACTATE, POTASSIUM CHLORIDE, CALCIUM CHLORIDE: 600; 310; 30; 20 INJECTION, SOLUTION INTRAVENOUS at 16:39:00

## 2022-12-04 RX ADMIN — MIDAZOLAM HYDROCHLORIDE 2 MG: 1 INJECTION INTRAMUSCULAR; INTRAVENOUS at 16:49:00

## 2022-12-04 RX ADMIN — BUPIVACAINE HYDROCHLORIDE 10 ML: 7.5 INJECTION, SOLUTION INTRASPINAL at 16:51:00

## 2022-12-04 RX ADMIN — KETAMINE HYDROCHLORIDE 50 MG: 50 INJECTION, SOLUTION, CONCENTRATE INTRAMUSCULAR; INTRAVENOUS at 16:55:00

## 2022-12-04 NOTE — OPERATIVE REPORT
[facility]  Operative Note    Rio Arita Location: OR   CSN [id number] MRN [id number]    1990 Age 29year old   Admission Date 2022 Operation Date 2022   Attending Physician Jewels Muller MD Operating Physician Christine Dai MD   PCP None Pcp          Patient Name: Rio Arita    Preoperative Diagnosis: Perianal abscess [K61.0]    Postoperative Diagnosis: Same as preoperative diagnosis    Primary Surgeon: Christine Dai MD    Assistant: None    Anesthesia: Spinal    Procedures: Examine under anesthesia with anoscopy, incision and drainage of perianal abscess    Implants: None    Specimen: Abscess fluid for culture    Drains: None    Estimated Blood Loss: 5 cc    Complications: None immediate    Condition: Stable    Indications for Surgery:   Rio Arita is a 29year old male with history of inflammatory bowel disease status post 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 His disease has generally been well controlled with Humira. He does follow with gastroenterology 8 orders for [facility]. He presents to the emergency department today with 1 day of worsening left-sided perianal pain in addition to 2 weeks of dysuria and urinary discharge. In the emergency department, he was worked up with a CT scan showing \"inflammatory changes seen in the subcutaneous tissues of the left perineum extending to the medial and posterior medial buttock. Focal area suspicious for developing abscess. Additional focus consistent with an abscess involving the left prostate gland. \"  I recommended proceeding urgently to the operating room for exam under anesthesia with anoscopy, incision and drainage of perirectal abscess, possible drain placement. The details of this procedure were discussed with the patient and his family at bedside including the expected recovery, risks, benefits and alternatives.   Patient expressed understanding was agreeable to proceed with surgery. Consent was signed. Surgical Findings:   Deep left anterior perirectal abscess. No obvious fistula. Description of Procedure:  Patient was brought to the operating room on the transport cart. Bilateral sequential compression devices were placed. Preoperative antibiotics were given. Patient was induced under spinal esthesia. Patient was then carefully flipped prone onto the OR table with all pressure points well-padded. Patient was positioned in prone jackknife with the buttocks retracted apart with silk tape. The anus and perianal skin were prepped and draped in the usual sterile fashion. A timeout was performed. I began with external exam of the anus. This revealed a small right lateral anal skin tag in addition to induration and erythema along the left anterior anoderm. I proceeded with digital rectal exam revealing an anastomosis 1-2 cm from the anal verge with stenosis. A small Hill-Balderas anoscope was inserted through the anus to examine the anorectal mucosa. Visualization was limited by the stenosis. The mucosa in the distal rectum and anal canal did appear friable. I proceeded to aspirate the left anterior anoderm about 2 cm from the anal verge using an 18-gauge needle. I immediately entered an abscess cavity with aspiration of purulent fluid. A sample this fluid was collected and sent for culture. I made a 1 cm circular skin opening in the left anterior anoderm 2 cm from the anal verge. I immediately entered the abscess cavity. The abscess cavity was explored with a hemostat and tracked deep along the rectum. I also used a fistula probe to explore the cavity and it tracked along the anal canal with no obvious internal opening. I injected the cavity with peroxide with a small Hill-Balderas anoscope in place to see if I could visualize an internal opening. There was no visualized egress of contrast internally.   Hemostasis was achieved with cautery. 30 cc of 0.25% Marcaine were injected circumferentially around the anus for local anesthetic. The skin was cleaned and dried and a dressing of 4 x 4 gauze and tape was applied. Patient was awakened from anesthesia and carefully flipped back supine onto the transport cart. He was transferred to the postanesthesia care unit in stable condition. All sponge, needle and instrument counts were correct at the end of the case. I was present for the entire case.     Mert Melendrez MD  12/4/2022  5:39 PM

## 2022-12-04 NOTE — PLAN OF CARE
Problem: Patient/Family Goals  Goal: Patient/Family Short Term Goal  Description: Patient's Short Term Goal:HAVE TOLERABLE PAIN  Interventions:  gIVE PAIN MEDS IF NEEDED  See additional Care Plan goals for specific interventions  Outcome: Progressing     Problem: PAIN - ADULT  Goal: Verbalizes/displays adequate comfort level or patient's stated pain goal  Description: INTERVENTIONS:  - Encourage pt to monitor pain and request assistance  - Assess pain using appropriate pain scale  - Administer analgesics based on type and severity of pain and evaluate response  - Implement non-pharmacological measures as appropriate and evaluate response  - Consider cultural and social influences on pain and pain management  - Manage/alleviate anxiety  - Utilize distraction and/or relaxation techniques  - Monitor for opioid side effects  - Notify MD/LIP if interventions unsuccessful or patient reports new pain  - Anticipate increased pain with activity and pre-medicate as appropriate  Outcome: Progressing     Problem: SAFETY ADULT - FALL  Goal: Free from fall injury  Description: INTERVENTIONS:  - Assess pt frequently for physical needs  - Identify cognitive and physical deficits and behaviors that affect risk of falls.   - [place] fall precautions as indicated by assessment.  - Educate pt/family on patient safety including physical limitations  - Instruct pt to call for assistance with activity based on assessment  - Modify environment to reduce risk of injury  - Provide assistive devices as appropriate  - Consider OT/PT consult to assist with strengthening/mobility  - Encourage toileting schedule  Outcome: Progressing     Problem: Patient/Family Goals  Goal: Patient/Family Long Term Goal  Description: Patient's Long Term Goal: GO HOME  Interventions:  - iv   -iv ANTIBIOTICS  Monitor labs  -VS q $ HRS  -SURGERY TODAY  Outcome: Not Progressing     Problem: RISK FOR INFECTION - ADULT  Goal: Absence of fever/infection during anticipated neutropenic period  Description: INTERVENTIONS  - Monitor WBC  - Administer growth factors as ordered  - Implement neutropenic guidelines  Outcome: Not Progressing

## 2022-12-04 NOTE — ED QUICK NOTES
Orders for admission, patient is aware of plan and ready to go upstairs. Any questions, please call ED RN Suzanne Leon  at extension [phone]. Vaccinated? yes  Type of COVID test sent:rapid  COVID Suspicion level: Low      Titratable drug(s) infusing:none  Rate:    LOC at time of transport:alert    Other pertinent information:    CIWA score=  NIH score=

## 2022-12-04 NOTE — ED INITIAL ASSESSMENT (HPI)
C/o pain to lt buttocks, behind testicles, for several days,  Worse since yesterday,  States has UTI

## 2022-12-04 NOTE — PROGRESS NOTES
NURSING ADMISSION NOTE      Patient admitted via CART FROM ER  Oriented to room. Safety precautions initiated. Bed in low position. Call light in reach. Alert & oriented 4. With tolerable pain. No nausea or vomiting. Kept NPO. left inner buttocks ,warm,tender,painful,red,firm to touch. No flatus. assessment done Plan    of care discussed with patient. Will monitor.

## 2022-12-05 ENCOUNTER — ANESTHESIA EVENT (OUTPATIENT)
Dept: SURGERY | Facility: HOSPITAL | Age: 32
End: 2022-12-05
Payer: COMMERCIAL

## 2022-12-05 ENCOUNTER — TELEPHONE (OUTPATIENT)
Dept: SURGERY | Facility: CLINIC | Age: 32
End: 2022-12-05

## 2022-12-05 DIAGNOSIS — N41.2 PROSTATIC ABSCESS: Primary | ICD-10-CM

## 2022-12-05 LAB
ANION GAP SERPL CALC-SCNC: 3 MMOL/L (ref 0–18)
BASOPHILS # BLD AUTO: 0.03 X10(3) UL (ref 0–0.2)
BASOPHILS NFR BLD AUTO: 0.2 %
BUN BLD-MCNC: 8 MG/DL (ref 7–18)
CALCIUM BLD-MCNC: 7.8 MG/DL (ref 8.5–10.1)
CHLORIDE SERPL-SCNC: 107 MMOL/L (ref 98–112)
CO2 SERPL-SCNC: 28 MMOL/L (ref 21–32)
CREAT BLD-MCNC: 1.02 MG/DL
EOSINOPHIL # BLD AUTO: 0.08 X10(3) UL (ref 0–0.7)
EOSINOPHIL NFR BLD AUTO: 0.5 %
ERYTHROCYTE [DISTWIDTH] IN BLOOD BY AUTOMATED COUNT: 14.3 %
GFR SERPLBLD BASED ON 1.73 SQ M-ARVRAT: 101 ML/MIN/1.73M2 (ref 60–?)
GLUCOSE BLD-MCNC: 116 MG/DL (ref 70–99)
HCT VFR BLD AUTO: 32.8 %
HGB BLD-MCNC: 10.8 G/DL
IMM GRANULOCYTES # BLD AUTO: 0.06 X10(3) UL (ref 0–1)
IMM GRANULOCYTES NFR BLD: 0.4 %
LYMPHOCYTES # BLD AUTO: 2.63 X10(3) UL (ref 1–4)
LYMPHOCYTES NFR BLD AUTO: 15.7 %
MCH RBC QN AUTO: 30.3 PG (ref 26–34)
MCHC RBC AUTO-ENTMCNC: 32.9 G/DL (ref 31–37)
MCV RBC AUTO: 92.1 FL
MONOCYTES # BLD AUTO: 1.66 X10(3) UL (ref 0.1–1)
MONOCYTES NFR BLD AUTO: 9.9 %
NEUTROPHILS # BLD AUTO: 12.32 X10 (3) UL (ref 1.5–7.7)
NEUTROPHILS # BLD AUTO: 12.32 X10(3) UL (ref 1.5–7.7)
NEUTROPHILS NFR BLD AUTO: 73.3 %
OSMOLALITY SERPL CALC.SUM OF ELEC: 285 MOSM/KG (ref 275–295)
PLATELET # BLD AUTO: 305 10(3)UL (ref 150–450)
POTASSIUM SERPL-SCNC: 3.4 MMOL/L (ref 3.5–5.1)
RBC # BLD AUTO: 3.56 X10(6)UL
SODIUM SERPL-SCNC: 138 MMOL/L (ref 136–145)
WBC # BLD AUTO: 16.8 X10(3) UL (ref 4–11)

## 2022-12-05 PROCEDURE — 99233 SBSQ HOSP IP/OBS HIGH 50: CPT | Performed by: INTERNAL MEDICINE

## 2022-12-05 PROCEDURE — 99222 1ST HOSP IP/OBS MODERATE 55: CPT | Performed by: SURGERY

## 2022-12-05 RX ORDER — POTASSIUM CHLORIDE 20 MEQ/1
40 TABLET, EXTENDED RELEASE ORAL EVERY 4 HOURS
Status: COMPLETED | OUTPATIENT
Start: 2022-12-05 | End: 2022-12-05

## 2022-12-05 NOTE — PLAN OF CARE
Problem: Patient/Family Goals  Goal: Patient/Family Long Term Goal  Description: Patient's Long Term Goal: Discharge    Interventions:  - Tolerate diet  - Pain management  - Wound care   - See additional Care Plan goals for specific interventions  Outcome: Progressing  Goal: Patient/Family Short Term Goal  Description: Patient's Short Term Goal: Comfort Care    Interventions:   - Cluster care  - Non pharm pain methods   - See additional Care Plan goals for specific interventions  Outcome: Progressing     Problem: PAIN - ADULT  Goal: Verbalizes/displays adequate comfort level or patient's stated pain goal  Description: INTERVENTIONS:  - Encourage pt to monitor pain and request assistance  - Assess pain using appropriate pain scale  - Administer analgesics based on type and severity of pain and evaluate response  - Implement non-pharmacological measures as appropriate and evaluate response  - Consider cultural and social influences on pain and pain management  - Manage/alleviate anxiety  - Utilize distraction and/or relaxation techniques  - Monitor for opioid side effects  - Notify MD/LIP if interventions unsuccessful or patient reports new pain  - Anticipate increased pain with activity and pre-medicate as appropriate  Outcome: Progressing     Problem: SAFETY ADULT - FALL  Goal: Free from fall injury  Description: INTERVENTIONS:  - Assess pt frequently for physical needs  - Identify cognitive and physical deficits and behaviors that affect risk of falls.   - [place] fall precautions as indicated by assessment.  - Educate pt/family on patient safety including physical limitations  - Instruct pt to call for assistance with activity based on assessment  - Modify environment to reduce risk of injury  - Provide assistive devices as appropriate  - Consider OT/PT consult to assist with strengthening/mobility  - Encourage toileting schedule  Outcome: Progressing     Problem: RISK FOR INFECTION - ADULT  Goal: Absence of fever/infection during anticipated neutropenic period  Description: INTERVENTIONS  - Monitor WBC  - Administer growth factors as ordered  - Implement neutropenic guidelines  Outcome: Progressing   Alert & oriented x4. With tolerable pain. Left buttocks  wound dressing changed as ordered. Passing flatus. Ambulation in hallways encouraged. Plan of care discussed with patient. Will monitor.

## 2022-12-05 NOTE — TELEPHONE ENCOUNTER
Patient added for tomorrow - TURP with unroofing of prostatic abscess. [facility] case request sent.

## 2022-12-05 NOTE — PLAN OF CARE
A&Ox4. VSS. RA. . Denies chest pain and SOB. Telemetry: NSR  GI: Abdomen soft, nondistended. Passing gas. Denies nausea. : Voids. Pain management with PRN pain medications. Up ab laure. Incisions: Lt buttocks  Diet: Regular/ General- tolerating well. IVF running per order. All appropriate safety measures in place. All questions and concerns addressed. Problem: Patient/Family Goals  Goal: Patient/Family Long Term Goal  Description: Patient's Long Term Goal: Discharge    Interventions:  - Tolerate diet  - Pain management  - Wound care   - See additional Care Plan goals for specific interventions  Outcome: Progressing  Goal: Patient/Family Short Term Goal  Description: Patient's Short Term Goal: Comfort Care    Interventions:   - Cluster care  - Non pharm pain methods   - See additional Care Plan goals for specific interventions  Outcome: Progressing     Problem: PAIN - ADULT  Goal: Verbalizes/displays adequate comfort level or patient's stated pain goal  Description: INTERVENTIONS:  - Encourage pt to monitor pain and request assistance  - Assess pain using appropriate pain scale  - Administer analgesics based on type and severity of pain and evaluate response  - Implement non-pharmacological measures as appropriate and evaluate response  - Consider cultural and social influences on pain and pain management  - Manage/alleviate anxiety  - Utilize distraction and/or relaxation techniques  - Monitor for opioid side effects  - Notify MD/LIP if interventions unsuccessful or patient reports new pain  - Anticipate increased pain with activity and pre-medicate as appropriate  Outcome: Progressing     Problem: SAFETY ADULT - FALL  Goal: Free from fall injury  Description: INTERVENTIONS:  - Assess pt frequently for physical needs  - Identify cognitive and physical deficits and behaviors that affect risk of falls.   - [place] fall precautions as indicated by assessment.  - Educate pt/family on patient safety including physical limitations  - Instruct pt to call for assistance with activity based on assessment  - Modify environment to reduce risk of injury  - Provide assistive devices as appropriate  - Consider OT/PT consult to assist with strengthening/mobility  - Encourage toileting schedule  Outcome: Progressing     Problem: RISK FOR INFECTION - ADULT  Goal: Absence of fever/infection during anticipated neutropenic period  Description: INTERVENTIONS  - Monitor WBC  - Administer growth factors as ordered  - Implement neutropenic guidelines  Outcome: Progressing

## 2022-12-05 NOTE — PROGRESS NOTES
Received from PACU per bed . Alert & oriented x4. With tolerable pain. no nausea or vomiting. Left buttocks dressing and mesh brief c/d/i. Due to void. Lower extremities sensation decreased. Able to do plantar and dorsiflexion of feet . Kept on bedrest Sepsis BPA fired -- notified. latest temp 99.7 F. Plan of care discussed with patient. Will monitor. Florencia Gil

## 2022-12-06 ENCOUNTER — ANESTHESIA (OUTPATIENT)
Dept: SURGERY | Facility: HOSPITAL | Age: 32
End: 2022-12-06
Payer: COMMERCIAL

## 2022-12-06 LAB — POTASSIUM SERPL-SCNC: 3.9 MMOL/L (ref 3.5–5.1)

## 2022-12-06 PROCEDURE — 0VB08ZZ EXCISION OF PROSTATE, VIA NATURAL OR ARTIFICIAL OPENING ENDOSCOPIC: ICD-10-PCS | Performed by: SURGERY

## 2022-12-06 PROCEDURE — 0DJD8ZZ INSPECTION OF LOWER INTESTINAL TRACT, VIA NATURAL OR ARTIFICIAL OPENING ENDOSCOPIC: ICD-10-PCS | Performed by: SURGERY

## 2022-12-06 PROCEDURE — 99232 SBSQ HOSP IP/OBS MODERATE 35: CPT | Performed by: INTERNAL MEDICINE

## 2022-12-06 RX ORDER — METOCLOPRAMIDE HYDROCHLORIDE 5 MG/ML
INJECTION INTRAMUSCULAR; INTRAVENOUS AS NEEDED
Status: DISCONTINUED | OUTPATIENT
Start: 2022-12-06 | End: 2022-12-06 | Stop reason: SURG

## 2022-12-06 RX ORDER — ONDANSETRON 2 MG/ML
INJECTION INTRAMUSCULAR; INTRAVENOUS AS NEEDED
Status: DISCONTINUED | OUTPATIENT
Start: 2022-12-06 | End: 2022-12-06 | Stop reason: SURG

## 2022-12-06 RX ORDER — MIDAZOLAM HYDROCHLORIDE 1 MG/ML
1 INJECTION INTRAMUSCULAR; INTRAVENOUS EVERY 5 MIN PRN
Status: DISCONTINUED | OUTPATIENT
Start: 2022-12-06 | End: 2022-12-06 | Stop reason: HOSPADM

## 2022-12-06 RX ORDER — SODIUM CHLORIDE 9 MG/ML
INJECTION, SOLUTION INTRAVENOUS CONTINUOUS
Status: DISCONTINUED | OUTPATIENT
Start: 2022-12-06 | End: 2022-12-06 | Stop reason: HOSPADM

## 2022-12-06 RX ORDER — ACETAMINOPHEN 500 MG
1000 TABLET ORAL ONCE AS NEEDED
Status: DISCONTINUED | OUTPATIENT
Start: 2022-12-06 | End: 2022-12-06 | Stop reason: HOSPADM

## 2022-12-06 RX ORDER — LABETALOL HYDROCHLORIDE 5 MG/ML
5 INJECTION, SOLUTION INTRAVENOUS EVERY 5 MIN PRN
Status: DISCONTINUED | OUTPATIENT
Start: 2022-12-06 | End: 2022-12-06 | Stop reason: HOSPADM

## 2022-12-06 RX ORDER — NALOXONE HYDROCHLORIDE 0.4 MG/ML
80 INJECTION, SOLUTION INTRAMUSCULAR; INTRAVENOUS; SUBCUTANEOUS AS NEEDED
Status: DISCONTINUED | OUTPATIENT
Start: 2022-12-06 | End: 2022-12-06 | Stop reason: HOSPADM

## 2022-12-06 RX ORDER — HYDROCODONE BITARTRATE AND ACETAMINOPHEN 10; 325 MG/1; MG/1
2 TABLET ORAL ONCE AS NEEDED
Status: DISCONTINUED | OUTPATIENT
Start: 2022-12-06 | End: 2022-12-06 | Stop reason: HOSPADM

## 2022-12-06 RX ORDER — DEXAMETHASONE SODIUM PHOSPHATE 4 MG/ML
VIAL (ML) INJECTION AS NEEDED
Status: DISCONTINUED | OUTPATIENT
Start: 2022-12-06 | End: 2022-12-06 | Stop reason: SURG

## 2022-12-06 RX ORDER — HYDROMORPHONE HYDROCHLORIDE 1 MG/ML
0.4 INJECTION, SOLUTION INTRAMUSCULAR; INTRAVENOUS; SUBCUTANEOUS EVERY 5 MIN PRN
Status: DISCONTINUED | OUTPATIENT
Start: 2022-12-06 | End: 2022-12-06 | Stop reason: HOSPADM

## 2022-12-06 RX ORDER — HYDROMORPHONE HYDROCHLORIDE 1 MG/ML
0.2 INJECTION, SOLUTION INTRAMUSCULAR; INTRAVENOUS; SUBCUTANEOUS EVERY 5 MIN PRN
Status: DISCONTINUED | OUTPATIENT
Start: 2022-12-06 | End: 2022-12-06 | Stop reason: HOSPADM

## 2022-12-06 RX ORDER — KETOROLAC TROMETHAMINE 30 MG/ML
INJECTION, SOLUTION INTRAMUSCULAR; INTRAVENOUS AS NEEDED
Status: DISCONTINUED | OUTPATIENT
Start: 2022-12-06 | End: 2022-12-06 | Stop reason: SURG

## 2022-12-06 RX ORDER — PROCHLORPERAZINE EDISYLATE 5 MG/ML
5 INJECTION INTRAMUSCULAR; INTRAVENOUS EVERY 8 HOURS PRN
Status: DISCONTINUED | OUTPATIENT
Start: 2022-12-06 | End: 2022-12-06 | Stop reason: HOSPADM

## 2022-12-06 RX ORDER — ONDANSETRON 2 MG/ML
4 INJECTION INTRAMUSCULAR; INTRAVENOUS EVERY 6 HOURS PRN
Status: DISCONTINUED | OUTPATIENT
Start: 2022-12-06 | End: 2022-12-06 | Stop reason: HOSPADM

## 2022-12-06 RX ORDER — HYDROMORPHONE HYDROCHLORIDE 1 MG/ML
INJECTION, SOLUTION INTRAMUSCULAR; INTRAVENOUS; SUBCUTANEOUS
Status: COMPLETED
Start: 2022-12-06 | End: 2022-12-06

## 2022-12-06 RX ORDER — MEPERIDINE HYDROCHLORIDE 25 MG/ML
12.5 INJECTION INTRAMUSCULAR; INTRAVENOUS; SUBCUTANEOUS AS NEEDED
Status: DISCONTINUED | OUTPATIENT
Start: 2022-12-06 | End: 2022-12-06 | Stop reason: HOSPADM

## 2022-12-06 RX ORDER — LIDOCAINE HYDROCHLORIDE 10 MG/ML
INJECTION, SOLUTION EPIDURAL; INFILTRATION; INTRACAUDAL; PERINEURAL AS NEEDED
Status: DISCONTINUED | OUTPATIENT
Start: 2022-12-06 | End: 2022-12-06 | Stop reason: SURG

## 2022-12-06 RX ORDER — SODIUM CHLORIDE, SODIUM LACTATE, POTASSIUM CHLORIDE, CALCIUM CHLORIDE 600; 310; 30; 20 MG/100ML; MG/100ML; MG/100ML; MG/100ML
INJECTION, SOLUTION INTRAVENOUS CONTINUOUS
Status: DISCONTINUED | OUTPATIENT
Start: 2022-12-06 | End: 2022-12-06 | Stop reason: HOSPADM

## 2022-12-06 RX ORDER — HYDROCODONE BITARTRATE AND ACETAMINOPHEN 10; 325 MG/1; MG/1
1 TABLET ORAL ONCE AS NEEDED
Status: DISCONTINUED | OUTPATIENT
Start: 2022-12-06 | End: 2022-12-06 | Stop reason: HOSPADM

## 2022-12-06 RX ORDER — HYDROMORPHONE HYDROCHLORIDE 1 MG/ML
0.6 INJECTION, SOLUTION INTRAMUSCULAR; INTRAVENOUS; SUBCUTANEOUS EVERY 5 MIN PRN
Status: DISCONTINUED | OUTPATIENT
Start: 2022-12-06 | End: 2022-12-06 | Stop reason: HOSPADM

## 2022-12-06 RX ORDER — LIDOCAINE HYDROCHLORIDE 20 MG/ML
JELLY TOPICAL AS NEEDED
Status: DISCONTINUED | OUTPATIENT
Start: 2022-12-06 | End: 2022-12-06 | Stop reason: HOSPADM

## 2022-12-06 RX ADMIN — DEXAMETHASONE SODIUM PHOSPHATE 8 MG: 4 MG/ML VIAL (ML) INJECTION at 07:16:00

## 2022-12-06 RX ADMIN — METOCLOPRAMIDE HYDROCHLORIDE 10 MG: 5 INJECTION INTRAMUSCULAR; INTRAVENOUS at 08:22:00

## 2022-12-06 RX ADMIN — SODIUM CHLORIDE: 9 INJECTION, SOLUTION INTRAVENOUS at 08:41:00

## 2022-12-06 RX ADMIN — LIDOCAINE HYDROCHLORIDE 50 MG: 10 INJECTION, SOLUTION EPIDURAL; INFILTRATION; INTRACAUDAL; PERINEURAL at 07:16:00

## 2022-12-06 RX ADMIN — KETOROLAC TROMETHAMINE 30 MG: 30 INJECTION, SOLUTION INTRAMUSCULAR; INTRAVENOUS at 08:22:00

## 2022-12-06 RX ADMIN — ONDANSETRON 4 MG: 2 INJECTION INTRAMUSCULAR; INTRAVENOUS at 08:22:00

## 2022-12-06 NOTE — PROGRESS NOTES
Vss. Patient back from surgery in stable condition. Pt on ra with 02 sats wnl. Lungs cta. Pt denies n/v. Regular diet resumes. Abdomen soft. Dressing to buttocks in place and c/d/i. Luu draining clear, yellow urine. Pt reports some pressure to penis. Scheduled tylenol and prn dilaudid given with good relief. ivf infusing, site intact. poc updated with pt and pt shows understanding. Will monitor.

## 2022-12-06 NOTE — OPERATIVE REPORT
Urology Operative Note    Attending Surgeon: Angel Rene MD    Assistant Surgeon: None    Patient Name: Jamal Marie    Date of Surgery: 12/6/2022    Preoperative Diagnosis: Prostatic abscess    Postoperative Diagnosis: Same    Procedure Performed: Cystoscopy, transurethral resection of prostate abscess, complex Luu catheter placement over wire    Indication:  Patient is a 29year old male with history of J-pouch presenting with perirectal and prostatic abscess. Surgery performed incision and drainage of perirectal abscess 2 adys ago. Here today for transurethral drainage of 2.8 x 2 cm left prostatic abscess. The patient was counseled on options, risks, and benefits and elected to undergo the above procedure. We discussed risks including, but not limited to, bleeding, infection, damage to surrounding structures, need for repeat procedure, urinary retention, retrograde ejaculation, and urinary incontinence. The patient understood these risks and wished to proceed with surgery. Findings:  Mild bulbar stricture requiring dilation of the 22 Portuguese cystoscope followed by resectoscope. Approximately 25 mL of pus drained from left lateral lobe prostatic abscess. Abscess was relatively superficial near the lumen of the prostatic urethra. Abscess cavity opened completely with sparing of the bladder neck and any resection near the verumontanum or sphincter. Base of abscess cavity fulgurated, no obvious fistulous tract seen. Procedure: The patient was taken to the operating room and a timeout was performed confirming the correct patient and procedure. The patient was prepped and draped in lithotomy position after undergoing general anesthesia. Pre-operative prophylactic antibiotics were given in the form of Zosyn. Chyrl Gull urethral sounds were used to sequentially dilate the distal urethra up to 28 [place], in order to accommodate the resectoscope.     The 25 Portuguese cystoscope was inserted per urethra and into the bladder. Bladder was normal.  Urethral orifice ease were orthotopic. Given some narrowing at the bulbar urethra a wire was placed through the cystoscope and left in the bladder and then the scope was removed. The 32 Ukrainian resectoscope was then inserted per urethra and was able to navigate beyond the bulbar stricture and into the prostate and bladder. The wire was removed. Using the bipolar loop I began resecting the bulging left lateral lobe where the prostate abscess was likely to be based on review of prior imaging. After only 2 swipes with the resecting loop pus began to drain from the abscess cavity. I resected open this further to fully drain the abscess cavity and leave a larger opening to prevent reaccumulation. I drained some pus and sent it for culture. Approximately 25 mL of pus was drained in total.  I then inspected the base of the cavity and there were no fistulous tracts present. I fulgurated the base for hemostasis and to prevent recurrence of this abscess. I was able to spare the bladder neck urethral sphincter/verumontanum. The abscess cavity was wide open by the end along the left side of the prostate. I then navigated back into the bladder and placed a wire back into the bladder. I removed the resectoscope and placed a 18 Ukrainian Mcgrath tip Luu catheter over the wire and into the bladder. The wire was removed and the balloon was inflated with 15 mL of sterile water. The Luu catheter was flushed and was draining clear yellow urine. The patient was awoken from anesthesia and transferred to PACU in stable condition. The patient tolerated the procedure well. All instrument/supply counts were correct at the end of the case.     Specimens:   Prostate abscess culture    Estimated Blood Loss:  5 mL    Tubes/Drains:  25 Ukrainian Mcgrath tip Luu catheter with 15 mL balloon    Complications:   None immediate    Condition from OR:  Stable    Plan:   I will plan to leave the catheter in for 1 week to allow healing of the abscess cavity. He will return to clinic in 1 week for void trial.  Okay for discharge from the hospital once systemic signs/symptoms of infection are resolved. Recommend at least 1-2 weeks of antibiotics based on culture data. Ander Harper.  Alvaro Bob MD  Staff Urologist  [facility]  Office: [phone]

## 2022-12-06 NOTE — ANESTHESIA PROCEDURE NOTES
Airway  Date/Time: 12/6/2022 7:17 AM  Urgency: elective    Airway not difficult    General Information and Staff    Patient location during procedure: OR  Anesthesiologist: Arielle Eden MD  Performed: anesthesiologist     Indications and Patient Condition  Indications for airway management: anesthesia  Sedation level: deep  Preoxygenated: yes  Patient position: sniffing  Mask difficulty assessment: 1 - vent by mask    Final Airway Details  Final airway type: supraglottic airway      Successful airway: classic  Size 4       Number of attempts at approach: 1

## 2022-12-06 NOTE — INTERVAL H&P NOTE
Pre-op Diagnosis: INPT    The above referenced H&P was reviewed by Abigail Rai MD on 12/6/2022, the patient was examined and no significant changes have occurred in the patient's condition since the H&P was performed. I discussed with the patient and/or legal representative the potential benefits, risks and side effects of this procedure; the likelihood of the patient achieving goals; and potential problems that might occur during recuperation. I discussed reasonable alternatives to the procedure, including risks, benefits and side effects related to the alternatives and risks related to not receiving this procedure. We will proceed with procedure as planned.

## 2022-12-06 NOTE — PLAN OF CARE
A&Ox4. VSS. RA. . Denies chest pain and SOB. GI: Abdomen soft, nondistended. Passing gas. Denies nausea. : Voids. Pain controlled with PRN pain medications. Up ab laure. Incisions: Left buttocks with 4x4 guaze- clean dry and intact   Diet: NPO at midnight   IVF running per order. All appropriate safety measures in place. All questions and concerns addressed. Problem: Patient/Family Goals  Goal: Patient/Family Long Term Goal  Description: Patient's Long Term Goal: Discharge    Interventions:  - Tolerate diet  - Pain management  - Wound care   - See additional Care Plan goals for specific interventions  Outcome: Progressing  Goal: Patient/Family Short Term Goal  Description: Patient's Short Term Goal: Comfort Care    Interventions:   - Cluster care  - Non pharm pain methods   - See additional Care Plan goals for specific interventions  Outcome: Progressing     Problem: PAIN - ADULT  Goal: Verbalizes/displays adequate comfort level or patient's stated pain goal  Description: INTERVENTIONS:  - Encourage pt to monitor pain and request assistance  - Assess pain using appropriate pain scale  - Administer analgesics based on type and severity of pain and evaluate response  - Implement non-pharmacological measures as appropriate and evaluate response  - Consider cultural and social influences on pain and pain management  - Manage/alleviate anxiety  - Utilize distraction and/or relaxation techniques  - Monitor for opioid side effects  - Notify MD/LIP if interventions unsuccessful or patient reports new pain  - Anticipate increased pain with activity and pre-medicate as appropriate  Outcome: Progressing     Problem: SAFETY ADULT - FALL  Goal: Free from fall injury  Description: INTERVENTIONS:  - Assess pt frequently for physical needs  - Identify cognitive and physical deficits and behaviors that affect risk of falls.   - [place] fall precautions as indicated by assessment.  - Educate pt/family on patient safety including physical limitations  - Instruct pt to call for assistance with activity based on assessment  - Modify environment to reduce risk of injury  - Provide assistive devices as appropriate  - Consider OT/PT consult to assist with strengthening/mobility  - Encourage toileting schedule  Outcome: Progressing     Problem: RISK FOR INFECTION - ADULT  Goal: Absence of fever/infection during anticipated neutropenic period  Description: INTERVENTIONS  - Monitor WBC  - Administer growth factors as ordered  - Implement neutropenic guidelines  Outcome: Progressing

## 2022-12-07 VITALS
WEIGHT: 180 LBS | DIASTOLIC BLOOD PRESSURE: 56 MMHG | SYSTOLIC BLOOD PRESSURE: 97 MMHG | RESPIRATION RATE: 18 BRPM | HEART RATE: 46 BPM | BODY MASS INDEX: 23.86 KG/M2 | TEMPERATURE: 98 F | HEIGHT: 73 IN | OXYGEN SATURATION: 100 %

## 2022-12-07 LAB
ERYTHROCYTE [DISTWIDTH] IN BLOOD BY AUTOMATED COUNT: 14.4 %
HCT VFR BLD AUTO: 36.5 %
HGB BLD-MCNC: 11.8 G/DL
MCH RBC QN AUTO: 30.4 PG (ref 26–34)
MCHC RBC AUTO-ENTMCNC: 32.3 G/DL (ref 31–37)
MCV RBC AUTO: 94.1 FL
PLATELET # BLD AUTO: 294 10(3)UL (ref 150–450)
RBC # BLD AUTO: 3.88 X10(6)UL
WBC # BLD AUTO: 10.9 X10(3) UL (ref 4–11)

## 2022-12-07 PROCEDURE — 99239 HOSP IP/OBS DSCHRG MGMT >30: CPT | Performed by: HOSPITALIST

## 2022-12-07 PROCEDURE — 99024 POSTOP FOLLOW-UP VISIT: CPT | Performed by: PHYSICIAN ASSISTANT

## 2022-12-07 RX ORDER — METRONIDAZOLE 500 MG/1
500 TABLET ORAL 3 TIMES DAILY
Qty: 42 TABLET | Refills: 0 | Status: SHIPPED | OUTPATIENT
Start: 2022-12-07 | End: 2022-12-21

## 2022-12-07 RX ORDER — CIPROFLOXACIN 500 MG/1
500 TABLET, FILM COATED ORAL 2 TIMES DAILY
Qty: 28 TABLET | Refills: 0 | Status: SHIPPED | OUTPATIENT
Start: 2022-12-07 | End: 2022-12-21

## 2022-12-07 RX ORDER — PHENAZOPYRIDINE HYDROCHLORIDE 100 MG/1
100 TABLET, FILM COATED ORAL
Status: DISCONTINUED | OUTPATIENT
Start: 2022-12-07 | End: 2022-12-07

## 2022-12-07 RX ORDER — HYDROCODONE BITARTRATE AND ACETAMINOPHEN 5; 325 MG/1; MG/1
1 TABLET ORAL EVERY 4 HOURS PRN
Qty: 30 TABLET | Refills: 0 | Status: SHIPPED | OUTPATIENT
Start: 2022-12-07

## 2022-12-07 NOTE — PLAN OF CARE
Problem: Patient/Family Goals  Goal: Patient/Family Long Term Goal  Description: Patient's Long Term Goal: Discharge    Interventions:  - Tolerate diet  - Pain management  - Wound care   - See additional Care Plan goals for specific interventions  Outcome: Progressing  Goal: Patient/Family Short Term Goal  Description: Patient's Short Term Goal: Comfort Care    Interventions:   - Cluster care  - Non pharm pain methods   - See additional Care Plan goals for specific interventions  Outcome: Progressing     Problem: PAIN - ADULT  Goal: Verbalizes/displays adequate comfort level or patient's stated pain goal  Description: INTERVENTIONS:  - Encourage pt to monitor pain and request assistance  - Assess pain using appropriate pain scale  - Administer analgesics based on type and severity of pain and evaluate response  - Implement non-pharmacological measures as appropriate and evaluate response  - Consider cultural and social influences on pain and pain management  - Manage/alleviate anxiety  - Utilize distraction and/or relaxation techniques  - Monitor for opioid side effects  - Notify MD/LIP if interventions unsuccessful or patient reports new pain  - Anticipate increased pain with activity and pre-medicate as appropriate  Outcome: Progressing     Problem: SAFETY ADULT - FALL  Goal: Free from fall injury  Description: INTERVENTIONS:  - Assess pt frequently for physical needs  - Identify cognitive and physical deficits and behaviors that affect risk of falls.   - [place] fall precautions as indicated by assessment.  - Educate pt/family on patient safety including physical limitations  - Instruct pt to call for assistance with activity based on assessment  - Modify environment to reduce risk of injury  - Provide assistive devices as appropriate  - Consider OT/PT consult to assist with strengthening/mobility  - Encourage toileting schedule  Outcome: Progressing     Problem: RISK FOR INFECTION - ADULT  Goal: Absence of fever/infection during anticipated neutropenic period  Description: INTERVENTIONS  - Monitor WBC  - Administer growth factors as ordered  - Implement neutropenic guidelines  Outcome: Progressing   Alert & oriented x4. With tolerable pain. Left buttocks wound clean and intact,no signs of infection noted. ,dressing changed. Passing flatus . Tolerated diet. Luu catheter intact draining dark red urine ,no clots. Up in room . Ambulation in hallway and use of I. S.encouraged. Plan of care discussed with patient. Will monitor.

## 2022-12-07 NOTE — PLAN OF CARE
Problem: Patient/Family Goals  Goal: Patient/Family Long Term Goal  Description: Patient's Long Term Goal: Discharge    Interventions:  - Tolerate diet  - Pain management  - Wound care   - See additional Care Plan goals for specific interventions  Outcome: Progressing  Goal: Patient/Family Short Term Goal  Description: Patient's Short Term Goal: Comfort Care    Interventions:   - Cluster care  - Non pharm pain methods   - See additional Care Plan goals for specific interventions  Outcome: Progressing     Problem: PAIN - ADULT  Goal: Verbalizes/displays adequate comfort level or patient's stated pain goal  Description: INTERVENTIONS:  - Encourage pt to monitor pain and request assistance  - Assess pain using appropriate pain scale  - Administer analgesics based on type and severity of pain and evaluate response  - Implement non-pharmacological measures as appropriate and evaluate response  - Consider cultural and social influences on pain and pain management  - Manage/alleviate anxiety  - Utilize distraction and/or relaxation techniques  - Monitor for opioid side effects  - Notify MD/LIP if interventions unsuccessful or patient reports new pain  - Anticipate increased pain with activity and pre-medicate as appropriate  Outcome: Progressing     Problem: SAFETY ADULT - FALL  Goal: Free from fall injury  Description: INTERVENTIONS:  - Assess pt frequently for physical needs  - Identify cognitive and physical deficits and behaviors that affect risk of falls.   - [place] fall precautions as indicated by assessment.  - Educate pt/family on patient safety including physical limitations  - Instruct pt to call for assistance with activity based on assessment  - Modify environment to reduce risk of injury  - Provide assistive devices as appropriate  - Consider OT/PT consult to assist with strengthening/mobility  - Encourage toileting schedule  Outcome: Progressing     Problem: RISK FOR INFECTION - ADULT  Goal: Absence of fever/infection during anticipated neutropenic period  Description: INTERVENTIONS  - Monitor WBC  - Administer growth factors as ordered  - Implement neutropenic guidelines  Outcome: Progressing

## 2022-12-07 NOTE — PLAN OF CARE
Pt A/O x 4. VSS. Pt afebrile. Reports pain controlled w/ dilaudid PRN. Sharif irrigated x 1 d/t c/o penile pain, darker urine color in sharif bag and small bleeding at tip of penis. 50cc NS irrigation solution flushed in, 30cc clear cheryl fluid returned. Lt buttock wound w small to scant drainage.      Problem: Patient/Family Goals  Goal: Patient/Family Long Term Goal  Description: Patient's Long Term Goal: Discharge    Interventions:  - Tolerate diet  - Pain management  - Wound care   - See additional Care Plan goals for specific interventions  12/7/2022 0223 by João Ferrell RN  Outcome: Progressing  12/6/2022 2046 by João Ferrell RN  Outcome: Progressing  Goal: Patient/Family Short Term Goal  Description: Patient's Short Term Goal: Comfort Care    Interventions:   - Cluster care  - Non pharm pain methods   - See additional Care Plan goals for specific interventions  12/7/2022 0223 by João Ferrell RN  Outcome: Progressing  12/6/2022 2046 by João Ferrell RN  Outcome: Progressing     Problem: PAIN - ADULT  Goal: Verbalizes/displays adequate comfort level or patient's stated pain goal  Description: INTERVENTIONS:  - Encourage pt to monitor pain and request assistance  - Assess pain using appropriate pain scale  - Administer analgesics based on type and severity of pain and evaluate response  - Implement non-pharmacological measures as appropriate and evaluate response  - Consider cultural and social influences on pain and pain management  - Manage/alleviate anxiety  - Utilize distraction and/or relaxation techniques  - Monitor for opioid side effects  - Notify MD/LIP if interventions unsuccessful or patient reports new pain  - Anticipate increased pain with activity and pre-medicate as appropriate  12/7/2022 0223 by João Ferrell RN  Outcome: Progressing  12/6/2022 2046 by João Ferrell RN  Outcome: Progressing     Problem: SAFETY ADULT - FALL  Goal: Free from fall injury  Description: INTERVENTIONS:  - Assess pt frequently for physical needs  - Identify cognitive and physical deficits and behaviors that affect risk of falls.   - [place] fall precautions as indicated by assessment.  - Educate pt/family on patient safety including physical limitations  - Instruct pt to call for assistance with activity based on assessment  - Modify environment to reduce risk of injury  - Provide assistive devices as appropriate  - Consider OT/PT consult to assist with strengthening/mobility  - Encourage toileting schedule  12/7/2022 0223 by Kenneth Randall RN  Outcome: Progressing  12/6/2022 2046 by Kenneth Randall RN  Outcome: Progressing     Problem: RISK FOR INFECTION - ADULT  Goal: Absence of fever/infection during anticipated neutropenic period  Description: INTERVENTIONS  - Monitor WBC  - Administer growth factors as ordered  - Implement neutropenic guidelines  12/7/2022 0223 by Kenneth Randall RN  Outcome: Progressing  12/6/2022 2046 by Kenneth Randall RN  Outcome: Progressing

## 2022-12-07 NOTE — PROGRESS NOTES
NURSING DISCHARGE NOTE    Discharged Home via Wheelchair. Accompanied by Family member and Support staff  Belongings Taken by patient/family     Verbal and written discharge instructions ,sharif /leg bag care instructions given to patient. Verbalized understanding. Scripts filled by [facility]. With tolerable pain. To home in stable condition. Bella Herrmann

## 2022-12-07 NOTE — DISCHARGE INSTRUCTIONS
*Keep incision site/perineal area clean and dry at all times  *May shower  *Change gauze dressing over Left Buttocks wound once a day and as needed  *Monitor Left Buttocks wound from signs of infection like redness,swelling,drainage,foul odor or temperature of > 101 F and notify M.D. as needed  *Walk as frequently as tolerated in moderation    *Sharif /leg bag care as instructed  *Wash hands before and after switching from legh bag to sharif bag and vice versa  *Please read sharif /leg bag handouts provided    *Follow up with your doctors at [facility]  *For any questions or concerns,please call  M.D.*

## 2022-12-08 ENCOUNTER — PATIENT OUTREACH (OUTPATIENT)
Dept: CASE MANAGEMENT | Age: 32
End: 2022-12-08

## 2022-12-08 NOTE — PROGRESS NOTES
CATERINAMEAGAN for post hospital follow up. [facility] contact information provided as well as [facility] office number, [phone].

## 2022-12-08 NOTE — PROGRESS NOTES
CATERINAMEAGAN for post hospital follow up. [facility] contact information provided as well as [facility] office number, [phone].

## 2022-12-08 NOTE — PAYOR COMM NOTE
Discharge Notification    Patient Name: Moose Coboser: ShericeClare Burnettanabellakacymarypatti Zmary jo 150 PPO  Subscriber #: [id number]  Authorization Number: Lesley White Date/Time: 12/4/2022 7:40 AM  Discharge Date/Time: 12/7/2022 6:02 PM

## 2022-12-12 ENCOUNTER — TELEPHONE (OUTPATIENT)
Dept: SURGERY | Facility: CLINIC | Age: 32
End: 2022-12-12

## 2022-12-12 ENCOUNTER — NURSE ONLY (OUTPATIENT)
Dept: SURGERY | Facility: CLINIC | Age: 32
End: 2022-12-12
Payer: COMMERCIAL

## 2022-12-12 DIAGNOSIS — Z98.890 STATUS POST SURGERY: Primary | ICD-10-CM

## 2022-12-12 NOTE — TELEPHONE ENCOUNTER
Per pt has a catheter and is scheduled to have it removed tomorrow and asking if he can have it removed today. Per pt he cannot take it and he cannot sleep with it.  Please advise

## 2022-12-12 NOTE — PROGRESS NOTES
Patient here for Luu removal. S/P Cysto, unrefined abscess of prostate. CATALINA made aware. Not agreeable to removing Luu today. Remove Luu as planned - Tuesday AM. RN spoke to patient and explained. Dr Merlin Corner out of office. Explained plans for removal and need Dr Merlin Corner to be paged and for him to wait, or he can come back at AM for the follow up with Dr Merlin Corner. Patient reports feeling uncomfortable with the Luu. RN explained anatomical position, Luu care and possible reasons of leaking. He verbalized understanding and agreed to plans to have the Luu checked and returned to office as scheduled. RN offered to assess Luu site as well. RN adjusted the stat-lock. Placed a new one.

## 2022-12-13 ENCOUNTER — OFFICE VISIT (OUTPATIENT)
Dept: SURGERY | Facility: CLINIC | Age: 32
End: 2022-12-13
Payer: COMMERCIAL

## 2022-12-13 DIAGNOSIS — N41.2 PROSTATE ABSCESS: Primary | ICD-10-CM

## 2022-12-13 PROCEDURE — 51798 US URINE CAPACITY MEASURE: CPT | Performed by: SURGERY

## 2022-12-13 PROCEDURE — 99024 POSTOP FOLLOW-UP VISIT: CPT | Performed by: SURGERY

## 2022-12-16 ENCOUNTER — TELEPHONE (OUTPATIENT)
Dept: SURGERY | Facility: CLINIC | Age: 32
End: 2022-12-16

## 2023-01-04 ENCOUNTER — TELEPHONE (OUTPATIENT)
Dept: SURGERY | Facility: CLINIC | Age: 33
End: 2023-01-04

## 2023-01-04 NOTE — TELEPHONE ENCOUNTER
RN called patient regarding the 12/28 MRI. He confirmed that he completed it and wishes to have a second opinion with [facility]. Patient agreed to upload the copy via [facility] [address]. All questions answered and verbalized understanding.     ----- Message from Cammy Gibbons MD sent at 12/29/2022  4:47 PM CST -----  Can someone give this patient a call at some point and see if he had any outside hospital imaging of his prostate after our last visit? He was supposed to have an MRI at [address] [place] [address] on 12/28, but I do not see it in [facility].     Thanks,  MB

## 2023-01-04 NOTE — TELEPHONE ENCOUNTER
RN called back. No answer. Left message.     * Patient completed MRI at [facility]  * Patient to follow up with [facility] for second opinion    Last seen by Dr John Mary 12/13/22:  -Complete course of antibiotics  -Agree with pelvic MRI on 12/28/2022 at [facility] to assess for resolution of prostate abscess  -If any surgical interventions are planned at [facility] he will likely need to link up with the urologist there  -Can return to clinic to see me as needed for any other urologic issues  -Avoid constipation and heavy lifting for the next 2-3 weeks

## (undated) DEVICE — MEGADYNE ELECTRODE ADULT PT RT

## (undated) DEVICE — HF-RESECTION ELECTRODE PLASMALOOP LOOP, MEDIUM, 24 FR., 12°/16°, ESG TURIS: Brand: OLYMPUS

## (undated) DEVICE — STERILE POLYISOPRENE POWDER-FREE SURGICAL GLOVES: Brand: PROTEXIS

## (undated) DEVICE — Device

## (undated) DEVICE — SOLUTION  .9 1000ML BTL

## (undated) DEVICE — SOLUTION  .9 3000ML

## (undated) DEVICE — SLEEVE KENDALL SCD EXPRESS MED

## (undated) DEVICE — PENCIL TELESCOPE MEGADYNE SE

## (undated) DEVICE — CATH FOLEY COUNCIL 18FR 5CC

## (undated) DEVICE — SYRINGE 30ML LL TIP

## (undated) DEVICE — SPONGE STICK WITH PVP-I: Brand: KENDALL

## (undated) DEVICE — SENS GUIW URO 150CM NIT SS

## (undated) DEVICE — WATER STERILE AQUALITE 1000ML

## (undated) DEVICE — RECTAL CDS-LF: Brand: MEDLINE INDUSTRIES, INC.

## (undated) DEVICE — ELLIK BLADDER EVACUTR DISP

## (undated) DEVICE — VIOLET BRAIDED (POLYGLACTIN 910), SYNTHETIC ABSORBABLE SUTURE: Brand: COATED VICRYL

## (undated) DEVICE — SPNG GZ W4XL4IN COT 12 PLY TYP

## (undated) DEVICE — CYSTO CDS-LF: Brand: MEDLINE INDUSTRIES, INC.

## (undated) NOTE — ED AVS SNAPSHOT
[facility] in [address] [facility]    Phone:  [phone]    Fax:  [phone]           Nehemiah Begin   MRN: [id number]    Department:  [facility] in [place]   Date of Visi IF THERE IS ANY CHANGE OR WORSENING OF YOUR CONDITION, CALL YOUR PRIMARY CARE PHYSICIAN AT ONCE OR RETURN IMMEDIATELY TO THE EMERGENCY DEPARTMENT.     If you have been prescribed any medication(s), please fill your prescription right away and begin taking t

## (undated) NOTE — ED AVS SNAPSHOT
[facility] in [address] [place] [address]    Phone:  [phone]    Fax:  [phone]           Slime Alaniz   MRN: [id number]    Department:  [facility] in [place]   Date of Visi Bring a paper prescription for each of these medications    - HYDROcodone-acetaminophen 5-325 MG Tabs  - predniSONE 20 MG Tabs            Discharge References/Attachments     ULCERATIVE COLITIS (ENGLISH)      Disclosure     Insurance plans vary and the ph CARE PHYSICIAN AT ONCE OR RETURN IMMEDIATELY TO THE EMERGENCY DEPARTMENT.     If you have been prescribed any medication(s), please fill your prescription right away and begin taking the medication(s) as directed    If the emergency physician has read X-ray coverage. Patient [address] is a Federal Navigator program that can help with your Affordable Care Act coverage, as well as all types of Medicaid plans.   To get signed up and covered, please call [phone] and ask to get set up for an insuran ADRENALS:  Normal.  No mass or enlargement. AORTA/VASCULAR:  Normal.  No aneurysm or dissection. RETROPERITONEUM:  Normal.  No mass or adenopathy.     BOWEL/MESENTERY:  There is short segment thickening of the distal descending colon just proximal to

## (undated) NOTE — IP AVS SNAPSHOT
Patient Demographics     Address Phone    [address]  [address] [facility] [phone] [facility]  [phone] [facility]      Emergency Contact(s)     Name [address] Father [phone]      Allergies as [id number] HYDROmorphone HCl PF (DILAUDID) 1 MG/ML injection 0.5 mg 06/09/17 1533 Given      [id number] HYDROmorphone HCl PF (DILAUDID) 1 MG/ML injection 0.5 mg 06/09/17 1655 Given      [id number] hydrocortisone Na succinate PF (SOLU-cortef) injection 100 mg Clostridium difficile(toxigenic)PCR Once [[id number]]  (Normal) Collected:  06/09/17 0923    Order Status:  Completed Lab Status:  Final result Updated:  06/09/17 4096    Specimen Information:  Stool from Stool      C.  Difficile(Toxigenic) By Pcr Negative C. Difficile(Toxigenic) By Pcr Negative for Toxigenic C. difficile          H&P - H&P Note      H&P by Ramo Judge MD at 6/2/2017 11:25 PM  Version 1 of 1    Author:  Ramo Judge MD Service:  Hospitalist Author Type:  Physician    Filed:  6/3/201 Budesonide (UCERIS OR) Take by mouth. Disp:  Rfl:    predniSONE 20 MG Oral Tab Take 2 tablets (40 mg total) by mouth daily.  Disp: 28 tablet Rfl: 0   HYDROcodone-acetaminophen 5-325 MG Oral Tab Take 1-2 tablets by mouth every 4 (four) hours as needed for Pa 1. Bloody Diarrhea and abdominal pain  1. Likely due to UC flare  2. Will r/o C.diff  3. IVF  4. GI consult  2. Ileus vs Parital SBO  1. NPO  2. IVF  3. Ulcerative colitis  1. As above  4. Hyponatremia  1. IVF  2.  Due to dehydration      Quality:  · DVT Pr descending colon colitis, sent home on Prednisone, but with no improvement in sx. Reports up to 20 BM's day with blood, including nocturnal sx. Denies NSAID use. No nausea, vomiting.  +fevers up to 103 over past few days  PMHx:   Past Medical History   Diag Respiratory: No shortness of breath, asthma, copd, recurrent pneumonia            Hematologic: The patient reports no easy bruising, frequent gum bleeding or nose bleeding;   The patient has no history of known chronic anemia            Dermatolog HCT 40.3 06/02/2017   .0 06/02/2017   CREATSERUM 1.20 06/03/2017   BUN 11 06/03/2017    06/03/2017   K 3.4 06/03/2017    06/03/2017   CO2 28.0 06/03/2017    06/03/2017   CA 8.5 06/03/2017   ALB 2.9 06/02/2017   ALKPHO 57 06/02/201 either reflects worsening colitis with ileus, or paradoxical worsening related to mesalamine. C.diff negative. High fevers at home are concerning, could be related to colitis, but need to r/o other superimposed infectious process    Recommendations:   1. ACO, BCBS ACO and [address] will be handled by the [address] [place] [address] Management team.  For all other patients, please follow usual protocol for discharge care transition.     Risk of readmission: Madelin Estes has Low Risk of readmission after discharge from th ? CT abd/pelvis 6/3:CONCLUSION:  Correlate clinically for acute colitis.  There is mural thickening involving the left colon mainly from splenic flexure to rectosigmoid with some focal additional mural thickening suggested in the proximal transverse colon c Follow-up appointment:   No follow-up provider specified.     Vital signs:  Temp:  [97.7 °F (36.5 °C)-99 °F (37.2 °C)] 98.5 °F (36.9 °C)  Pulse:  [54] 54  Resp:  [18] 18  BP: (136-137)/(69-84) 136/69 mmHg    Physical Exam:    General: No acute distress.   Please note that only IHP DMG and EMG patients enrolled in the Medicare ACO, [facility] ACO and [address] [place] [address] will be handled by the [address] [place], [place] [address]S [facility] Management team.  For all other patients, please follow usual protocol for discharge care transition.     Risk of readmissio Incidental or significant findings and recommendations (brief descriptions):  ? CT abd/pelvis 6/3:CONCLUSION:  Correlate clinically for acute colitis.  There is mural thickening involving the left colon mainly from splenic flexure to rectosigmoid with some Follow-up appointment:   No follow-up provider specified.     Vital signs:  Temp:  [97.7 °F (36.5 °C)-99 °F (37.2 °C)] 98.5 °F (36.9 °C)  Pulse:  [54] 54  Resp:  [18] 18  BP: (136-137)/(69-84) 136/69 mmHg    Physical Exam:    General: No acute dis

## (undated) NOTE — IP AVS SNAPSHOT
[facility] [place]  [address] [place], [address] ~ [phone]                Discharge Summary   6/2/2017    Antonio Harrison           Admission Information        Provider Department    6/2/2017 Cheri Bills MD  [facility]-A MMR 3/7/1997, 10/25/1993    OPV 3/7/1997, 1/25/1994, 11/20/1992, 9/21/1992    TDAP 7/13/2006    Tb Christina Test 3/7/1997, 4/17/1995, 4/20/1993      Recent Hematology Lab Results  (Last 3 results in the past 90 days)    WBC RBC Hemoglobin Hematocrit MCV MCH M - If you have concerns related to behavioral health issues or thoughts of harming yourself, contact [address] [place] [address] at [phone].     - If you don’t have insurance, [facility] has partnered with [facility] [employer] [facility] call your provider or healthcare team if you have any questions regarding your medications while at home.          GI Medications     Dicyclomine HCl 10 MG/5ML Oral Solution    Mesalamine (LIALDA OR)       Use:  Nausea/vomiting, acid reflux, low bowel motil